# Patient Record
Sex: MALE | Race: WHITE | Employment: OTHER | URBAN - METROPOLITAN AREA
[De-identification: names, ages, dates, MRNs, and addresses within clinical notes are randomized per-mention and may not be internally consistent; named-entity substitution may affect disease eponyms.]

---

## 2017-09-25 ENCOUNTER — CASE MANAGEMENT (OUTPATIENT)
Dept: EMERGENCY DEPT | Age: 72
End: 2017-09-25

## 2018-10-06 ENCOUNTER — APPOINTMENT (OUTPATIENT)
Dept: GENERAL RADIOLOGY | Age: 73
End: 2018-10-06
Payer: MEDICARE

## 2018-10-06 ENCOUNTER — HOSPITAL ENCOUNTER (EMERGENCY)
Age: 73
Discharge: HOME | End: 2018-10-07
Attending: EMERGENCY MEDICINE
Payer: MEDICARE

## 2018-10-06 DIAGNOSIS — T46.3X5A: ICD-10-CM

## 2018-10-06 DIAGNOSIS — R07.9 CHEST PAIN, UNSPECIFIED TYPE: Primary | ICD-10-CM

## 2018-10-06 DIAGNOSIS — R11.0 NAUSEA: ICD-10-CM

## 2018-10-06 DIAGNOSIS — I50.9 ACUTE ON CHRONIC CONGESTIVE HEART FAILURE, UNSPECIFIED HEART FAILURE TYPE (HCC): ICD-10-CM

## 2018-10-06 DIAGNOSIS — Z76.5 DRUG-SEEKING BEHAVIOR: ICD-10-CM

## 2018-10-06 DIAGNOSIS — G44.40: ICD-10-CM

## 2018-10-06 DIAGNOSIS — E87.6 HYPOKALEMIA: ICD-10-CM

## 2018-10-06 LAB
A/G RATIO: 1.4 (ref 1.1–2.2)
ALBUMIN SERPL-MCNC: 4.3 G/DL (ref 3.4–5)
ALP BLD-CCNC: 45 U/L (ref 40–129)
ALT SERPL-CCNC: 25 U/L (ref 10–40)
ANION GAP SERPL CALCULATED.3IONS-SCNC: 14 MMOL/L (ref 3–16)
AST SERPL-CCNC: 23 U/L (ref 15–37)
BASOPHILS ABSOLUTE: 0 K/UL (ref 0–0.2)
BASOPHILS RELATIVE PERCENT: 0.6 %
BILIRUB SERPL-MCNC: 0.5 MG/DL (ref 0–1)
BUN BLDV-MCNC: 17 MG/DL (ref 7–20)
CALCIUM SERPL-MCNC: 9.7 MG/DL (ref 8.3–10.6)
CHLORIDE BLD-SCNC: 100 MMOL/L (ref 99–110)
CO2: 27 MMOL/L (ref 21–32)
CREAT SERPL-MCNC: 1.1 MG/DL (ref 0.8–1.3)
EOSINOPHILS ABSOLUTE: 0.1 K/UL (ref 0–0.6)
EOSINOPHILS RELATIVE PERCENT: 1.8 %
GFR AFRICAN AMERICAN: >60
GFR NON-AFRICAN AMERICAN: >60
GLOBULIN: 3 G/DL
GLUCOSE BLD-MCNC: 107 MG/DL (ref 70–99)
HCT VFR BLD CALC: 36.6 % (ref 40.5–52.5)
HEMOGLOBIN: 11.8 G/DL (ref 13.5–17.5)
LYMPHOCYTES ABSOLUTE: 1.4 K/UL (ref 1–5.1)
LYMPHOCYTES RELATIVE PERCENT: 23.1 %
MCH RBC QN AUTO: 25.5 PG (ref 26–34)
MCHC RBC AUTO-ENTMCNC: 32.4 G/DL (ref 31–36)
MCV RBC AUTO: 78.8 FL (ref 80–100)
MONOCYTES ABSOLUTE: 0.8 K/UL (ref 0–1.3)
MONOCYTES RELATIVE PERCENT: 13.8 %
NEUTROPHILS ABSOLUTE: 3.7 K/UL (ref 1.7–7.7)
NEUTROPHILS RELATIVE PERCENT: 60.7 %
PDW BLD-RTO: 21.7 % (ref 12.4–15.4)
PLATELET # BLD: 214 K/UL (ref 135–450)
PMV BLD AUTO: 7 FL (ref 5–10.5)
POTASSIUM SERPL-SCNC: 3 MMOL/L (ref 3.5–5.1)
RBC # BLD: 4.64 M/UL (ref 4.2–5.9)
SODIUM BLD-SCNC: 141 MMOL/L (ref 136–145)
TOTAL PROTEIN: 7.3 G/DL (ref 6.4–8.2)
TROPONIN: <0.01 NG/ML
WBC # BLD: 6.1 K/UL (ref 4–11)

## 2018-10-06 PROCEDURE — 85379 FIBRIN DEGRADATION QUANT: CPT

## 2018-10-06 PROCEDURE — 85025 COMPLETE CBC W/AUTO DIFF WBC: CPT

## 2018-10-06 PROCEDURE — 83880 ASSAY OF NATRIURETIC PEPTIDE: CPT

## 2018-10-06 PROCEDURE — 80053 COMPREHEN METABOLIC PANEL: CPT

## 2018-10-06 PROCEDURE — 71045 X-RAY EXAM CHEST 1 VIEW: CPT

## 2018-10-06 PROCEDURE — 85610 PROTHROMBIN TIME: CPT

## 2018-10-06 PROCEDURE — 36415 COLL VENOUS BLD VENIPUNCTURE: CPT

## 2018-10-06 PROCEDURE — 99285 EMERGENCY DEPT VISIT HI MDM: CPT

## 2018-10-06 PROCEDURE — 84484 ASSAY OF TROPONIN QUANT: CPT

## 2018-10-06 PROCEDURE — 93005 ELECTROCARDIOGRAM TRACING: CPT | Performed by: EMERGENCY MEDICINE

## 2018-10-06 ASSESSMENT — PAIN DESCRIPTION - PROGRESSION: CLINICAL_PROGRESSION: NOT CHANGED

## 2018-10-06 ASSESSMENT — PAIN DESCRIPTION - PAIN TYPE: TYPE: ACUTE PAIN

## 2018-10-06 ASSESSMENT — PAIN DESCRIPTION - LOCATION: LOCATION: CHEST

## 2018-10-06 ASSESSMENT — PAIN DESCRIPTION - DESCRIPTORS: DESCRIPTORS: DISCOMFORT;HEAVINESS

## 2018-10-06 ASSESSMENT — PAIN SCALES - GENERAL: PAINLEVEL_OUTOF10: 9

## 2018-10-07 ENCOUNTER — APPOINTMENT (OUTPATIENT)
Dept: GENERAL RADIOLOGY | Age: 73
End: 2018-10-07
Payer: MEDICARE

## 2018-10-07 VITALS
BODY MASS INDEX: 32.2 KG/M2 | HEIGHT: 71 IN | TEMPERATURE: 97.7 F | OXYGEN SATURATION: 97 % | SYSTOLIC BLOOD PRESSURE: 136 MMHG | HEART RATE: 86 BPM | RESPIRATION RATE: 14 BRPM | WEIGHT: 230 LBS | DIASTOLIC BLOOD PRESSURE: 87 MMHG

## 2018-10-07 LAB
D DIMER: 216 NG/ML DDU (ref 0–229)
INR BLD: 1.02 (ref 0.86–1.14)
PRO-BNP: 168 PG/ML (ref 0–124)
PROTHROMBIN TIME: 11.6 SEC (ref 9.8–13)
TROPONIN: <0.01 NG/ML

## 2018-10-07 PROCEDURE — 6370000000 HC RX 637 (ALT 250 FOR IP): Performed by: EMERGENCY MEDICINE

## 2018-10-07 PROCEDURE — 84484 ASSAY OF TROPONIN QUANT: CPT

## 2018-10-07 PROCEDURE — 6360000002 HC RX W HCPCS: Performed by: EMERGENCY MEDICINE

## 2018-10-07 PROCEDURE — 93010 ELECTROCARDIOGRAM REPORT: CPT | Performed by: INTERNAL MEDICINE

## 2018-10-07 PROCEDURE — 96374 THER/PROPH/DIAG INJ IV PUSH: CPT

## 2018-10-07 PROCEDURE — 36415 COLL VENOUS BLD VENIPUNCTURE: CPT

## 2018-10-07 PROCEDURE — 71045 X-RAY EXAM CHEST 1 VIEW: CPT

## 2018-10-07 PROCEDURE — 6360000002 HC RX W HCPCS

## 2018-10-07 PROCEDURE — 96375 TX/PRO/DX INJ NEW DRUG ADDON: CPT

## 2018-10-07 RX ORDER — FENTANYL CITRATE 50 UG/ML
50 INJECTION, SOLUTION INTRAMUSCULAR; INTRAVENOUS ONCE
Status: COMPLETED | OUTPATIENT
Start: 2018-10-07 | End: 2018-10-07

## 2018-10-07 RX ORDER — FUROSEMIDE 10 MG/ML
40 INJECTION INTRAMUSCULAR; INTRAVENOUS ONCE
Status: COMPLETED | OUTPATIENT
Start: 2018-10-07 | End: 2018-10-07

## 2018-10-07 RX ORDER — HYDROMORPHONE HCL 110MG/55ML
PATIENT CONTROLLED ANALGESIA SYRINGE INTRAVENOUS
Status: COMPLETED
Start: 2018-10-07 | End: 2018-10-07

## 2018-10-07 RX ORDER — NITROGLYCERIN 0.4 MG/1
0.4 TABLET SUBLINGUAL ONCE
Status: COMPLETED | OUTPATIENT
Start: 2018-10-07 | End: 2018-10-07

## 2018-10-07 RX ORDER — ASPIRIN 81 MG/1
243 TABLET, CHEWABLE ORAL ONCE
Status: COMPLETED | OUTPATIENT
Start: 2018-10-07 | End: 2018-10-07

## 2018-10-07 RX ORDER — ONDANSETRON 2 MG/ML
4 INJECTION INTRAMUSCULAR; INTRAVENOUS ONCE
Status: COMPLETED | OUTPATIENT
Start: 2018-10-07 | End: 2018-10-07

## 2018-10-07 RX ORDER — ONDANSETRON 4 MG/1
4 TABLET, ORALLY DISINTEGRATING ORAL EVERY 8 HOURS PRN
Qty: 16 TABLET | Refills: 0 | Status: SHIPPED | OUTPATIENT
Start: 2018-10-07

## 2018-10-07 RX ORDER — POTASSIUM CHLORIDE 750 MG/1
40 TABLET, EXTENDED RELEASE ORAL ONCE
Status: COMPLETED | OUTPATIENT
Start: 2018-10-07 | End: 2018-10-07

## 2018-10-07 RX ORDER — HYDROCODONE BITARTRATE AND ACETAMINOPHEN 5; 325 MG/1; MG/1
1 TABLET ORAL ONCE
Status: COMPLETED | OUTPATIENT
Start: 2018-10-07 | End: 2018-10-07

## 2018-10-07 RX ADMIN — HYDROMORPHONE HYDROCHLORIDE 1 MG: 2 INJECTION INTRAMUSCULAR; INTRAVENOUS; SUBCUTANEOUS at 02:42

## 2018-10-07 RX ADMIN — NITROGLYCERIN 0.4 MG: 0.4 TABLET SUBLINGUAL at 01:12

## 2018-10-07 RX ADMIN — FUROSEMIDE 40 MG: 10 INJECTION, SOLUTION INTRAMUSCULAR; INTRAVENOUS at 00:33

## 2018-10-07 RX ADMIN — POTASSIUM CHLORIDE 40 MEQ: 750 TABLET, FILM COATED, EXTENDED RELEASE ORAL at 00:33

## 2018-10-07 RX ADMIN — HYDROCODONE BITARTRATE AND ACETAMINOPHEN 1 TABLET: 5; 325 TABLET ORAL at 01:38

## 2018-10-07 RX ADMIN — ONDANSETRON 4 MG: 2 INJECTION INTRAMUSCULAR; INTRAVENOUS at 00:33

## 2018-10-07 RX ADMIN — FENTANYL CITRATE 50 MCG: 50 INJECTION INTRAMUSCULAR; INTRAVENOUS at 00:33

## 2018-10-07 RX ADMIN — ASPIRIN 81 MG 243 MG: 81 TABLET ORAL at 00:33

## 2018-10-07 ASSESSMENT — PAIN SCALES - GENERAL
PAINLEVEL_OUTOF10: 9
PAINLEVEL_OUTOF10: 8
PAINLEVEL_OUTOF10: 9
PAINLEVEL_OUTOF10: 8

## 2018-10-07 ASSESSMENT — PAIN DESCRIPTION - PROGRESSION: CLINICAL_PROGRESSION: NOT CHANGED

## 2018-10-07 ASSESSMENT — PAIN DESCRIPTION - PAIN TYPE: TYPE: ACUTE PAIN

## 2018-10-07 ASSESSMENT — PAIN DESCRIPTION - LOCATION: LOCATION: CHEST

## 2018-10-07 ASSESSMENT — PAIN DESCRIPTION - DESCRIPTORS: DESCRIPTORS: DISCOMFORT;TIGHTNESS

## 2018-10-07 NOTE — ED PROVIDER NOTES
and family. Patient and family in agreement with plan. Orders Placed This Encounter   Procedures    XR CHEST PORTABLE    XR CHEST PORTABLE    CBC auto differential    Comprehensive metabolic panel    Troponin    Brain Natriuretic Peptide    Protime-INR    D-Dimer, Quantitative    Troponin    Telemetry monitoring    EKG 12 Lead    Insert peripheral IV     Orders Placed This Encounter   Medications    furosemide (LASIX) injection 40 mg    aspirin chewable tablet 243 mg    nitroGLYCERIN (NITROSTAT) SL tablet 0.4 mg    fentaNYL (SUBLIMAZE) injection 50 mcg    ondansetron (ZOFRAN) injection 4 mg    potassium chloride (KLOR-CON M) extended release tablet 40 mEq    HYDROcodone-acetaminophen (NORCO) 5-325 MG per tablet 1 tablet    DISCONTD: HYDROmorphone (DILAUDID) injection 1 mg    HYDROmorphone (DILAUDID) 2 MG/ML injection     JAIME IBARRA: cabinet override    ondansetron (ZOFRAN ODT) 4 MG disintegrating tablet     Sig: Take 1 tablet by mouth every 8 hours as needed for Nausea or Vomiting     Dispense:  16 tablet     Refill:  0     Patient was given scripts for the following medications. I counseled patient how to take these medications. Discharge Medication List as of 10/7/2018  3:11 AM      START taking these medications    Details   ondansetron (ZOFRAN ODT) 4 MG disintegrating tablet Take 1 tablet by mouth every 8 hours as needed for Nausea or Vomiting, Disp-16 tablet, R-0Print             CLINICAL IMPRESSION  1. Chest pain, unspecified type    2. Drug-seeking behavior    3. Acute on chronic congestive heart failure, unspecified heart failure type ([address]    4. Hypokalemia    5. Nausea    6. Nitroglycerin-induced headache      DISPOSITION  Rena Nj was discharged to home in stable condition.                    Diana Logan DO  10/29/18 5975

## 2018-10-07 NOTE — ED NOTES
Pt had to wait until 0500 to call ride home r/t narcotic adm. Pt to be discharged with family.   Pt verbalizes an understanding of instructions and f/u care     Domonique Delarosa RN  10/07/18 1852

## 2018-10-09 LAB
EKG ATRIAL RATE: 85 BPM
EKG DIAGNOSIS: NORMAL
EKG P AXIS: 53 DEGREES
EKG P-R INTERVAL: 148 MS
EKG Q-T INTERVAL: 390 MS
EKG QRS DURATION: 102 MS
EKG QTC CALCULATION (BAZETT): 464 MS
EKG R AXIS: -22 DEGREES
EKG T AXIS: 66 DEGREES
EKG VENTRICULAR RATE: 85 BPM

## 2018-10-18 ENCOUNTER — APPOINTMENT (OUTPATIENT)
Dept: GENERAL RADIOLOGY | Age: 73
End: 2018-10-18
Payer: OTHER GOVERNMENT

## 2018-10-18 ENCOUNTER — HOSPITAL ENCOUNTER (OUTPATIENT)
Age: 73
Setting detail: OBSERVATION
Discharge: HOME | End: 2018-10-19
Attending: EMERGENCY MEDICINE | Admitting: INTERNAL MEDICINE
Payer: OTHER GOVERNMENT

## 2018-10-18 DIAGNOSIS — R07.9 CHEST PAIN, UNSPECIFIED TYPE: Primary | ICD-10-CM

## 2018-10-18 DIAGNOSIS — I25.10 CORONARY ARTERY DISEASE INVOLVING NATIVE HEART, ANGINA PRESENCE UNSPECIFIED, UNSPECIFIED VESSEL OR LESION TYPE: ICD-10-CM

## 2018-10-18 LAB
A/G RATIO: 1.8 (ref 1.1–2.2)
ALBUMIN SERPL-MCNC: 4.5 G/DL (ref 3.4–5)
ALP BLD-CCNC: 35 U/L (ref 40–129)
ALT SERPL-CCNC: 18 U/L (ref 10–40)
ANION GAP SERPL CALCULATED.3IONS-SCNC: 12 MMOL/L (ref 3–16)
ANION GAP SERPL CALCULATED.3IONS-SCNC: 12 MMOL/L (ref 3–16)
AST SERPL-CCNC: 19 U/L (ref 15–37)
BASOPHILS ABSOLUTE: 0 K/UL (ref 0–0.2)
BASOPHILS RELATIVE PERCENT: 0.8 %
BILIRUB SERPL-MCNC: 0.4 MG/DL (ref 0–1)
BUN BLDV-MCNC: 11 MG/DL (ref 7–20)
BUN BLDV-MCNC: 12 MG/DL (ref 7–20)
CALCIUM SERPL-MCNC: 9.3 MG/DL (ref 8.3–10.6)
CALCIUM SERPL-MCNC: 9.6 MG/DL (ref 8.3–10.6)
CHLORIDE BLD-SCNC: 95 MMOL/L (ref 99–110)
CHLORIDE BLD-SCNC: 98 MMOL/L (ref 99–110)
CHOLESTEROL, TOTAL: 118 MG/DL (ref 0–199)
CO2: 27 MMOL/L (ref 21–32)
CO2: 29 MMOL/L (ref 21–32)
CREAT SERPL-MCNC: 1 MG/DL (ref 0.8–1.3)
CREAT SERPL-MCNC: 1.1 MG/DL (ref 0.8–1.3)
EKG ATRIAL RATE: 63 BPM
EKG ATRIAL RATE: 69 BPM
EKG DIAGNOSIS: NORMAL
EKG DIAGNOSIS: NORMAL
EKG P AXIS: -1 DEGREES
EKG P AXIS: 30 DEGREES
EKG P-R INTERVAL: 146 MS
EKG P-R INTERVAL: 168 MS
EKG Q-T INTERVAL: 402 MS
EKG Q-T INTERVAL: 436 MS
EKG QRS DURATION: 104 MS
EKG QRS DURATION: 108 MS
EKG QTC CALCULATION (BAZETT): 430 MS
EKG QTC CALCULATION (BAZETT): 446 MS
EKG R AXIS: -21 DEGREES
EKG R AXIS: -23 DEGREES
EKG T AXIS: 31 DEGREES
EKG T AXIS: 37 DEGREES
EKG VENTRICULAR RATE: 63 BPM
EKG VENTRICULAR RATE: 69 BPM
EOSINOPHILS ABSOLUTE: 0.1 K/UL (ref 0–0.6)
EOSINOPHILS RELATIVE PERCENT: 1.4 %
GFR AFRICAN AMERICAN: >60
GFR AFRICAN AMERICAN: >60
GFR NON-AFRICAN AMERICAN: >60
GFR NON-AFRICAN AMERICAN: >60
GLOBULIN: 2.5 G/DL
GLUCOSE BLD-MCNC: 115 MG/DL (ref 70–99)
GLUCOSE BLD-MCNC: 118 MG/DL (ref 70–99)
HCT VFR BLD CALC: 34.2 % (ref 40.5–52.5)
HDLC SERPL-MCNC: 39 MG/DL (ref 40–60)
HEMOGLOBIN: 11.3 G/DL (ref 13.5–17.5)
LDL CHOLESTEROL CALCULATED: 54 MG/DL
LYMPHOCYTES ABSOLUTE: 1.3 K/UL (ref 1–5.1)
LYMPHOCYTES RELATIVE PERCENT: 23.8 %
MAGNESIUM: 2.1 MG/DL (ref 1.8–2.4)
MCH RBC QN AUTO: 25.5 PG (ref 26–34)
MCHC RBC AUTO-ENTMCNC: 33 G/DL (ref 31–36)
MCV RBC AUTO: 77.3 FL (ref 80–100)
MONOCYTES ABSOLUTE: 0.7 K/UL (ref 0–1.3)
MONOCYTES RELATIVE PERCENT: 11.7 %
NEUTROPHILS ABSOLUTE: 3.5 K/UL (ref 1.7–7.7)
NEUTROPHILS RELATIVE PERCENT: 62.3 %
PDW BLD-RTO: 20.6 % (ref 12.4–15.4)
PLATELET # BLD: 233 K/UL (ref 135–450)
PMV BLD AUTO: 7.4 FL (ref 5–10.5)
POTASSIUM REFLEX MAGNESIUM: 3.1 MMOL/L (ref 3.5–5.1)
POTASSIUM SERPL-SCNC: 3.2 MMOL/L (ref 3.5–5.1)
RBC # BLD: 4.43 M/UL (ref 4.2–5.9)
SODIUM BLD-SCNC: 136 MMOL/L (ref 136–145)
SODIUM BLD-SCNC: 137 MMOL/L (ref 136–145)
TOTAL PROTEIN: 7 G/DL (ref 6.4–8.2)
TRIGL SERPL-MCNC: 124 MG/DL (ref 0–150)
TROPONIN: <0.01 NG/ML
VLDLC SERPL CALC-MCNC: 25 MG/DL
WBC # BLD: 5.7 K/UL (ref 4–11)

## 2018-10-18 PROCEDURE — 93005 ELECTROCARDIOGRAM TRACING: CPT | Performed by: INTERNAL MEDICINE

## 2018-10-18 PROCEDURE — 6370000000 HC RX 637 (ALT 250 FOR IP): Performed by: INTERNAL MEDICINE

## 2018-10-18 PROCEDURE — 6370000000 HC RX 637 (ALT 250 FOR IP): Performed by: EMERGENCY MEDICINE

## 2018-10-18 PROCEDURE — 6360000002 HC RX W HCPCS: Performed by: EMERGENCY MEDICINE

## 2018-10-18 PROCEDURE — 93010 ELECTROCARDIOGRAM REPORT: CPT | Performed by: INTERNAL MEDICINE

## 2018-10-18 PROCEDURE — 84484 ASSAY OF TROPONIN QUANT: CPT

## 2018-10-18 PROCEDURE — 96376 TX/PRO/DX INJ SAME DRUG ADON: CPT

## 2018-10-18 PROCEDURE — 96375 TX/PRO/DX INJ NEW DRUG ADDON: CPT

## 2018-10-18 PROCEDURE — 96372 THER/PROPH/DIAG INJ SC/IM: CPT

## 2018-10-18 PROCEDURE — 6370000000 HC RX 637 (ALT 250 FOR IP)

## 2018-10-18 PROCEDURE — 36415 COLL VENOUS BLD VENIPUNCTURE: CPT

## 2018-10-18 PROCEDURE — G0378 HOSPITAL OBSERVATION PER HR: HCPCS

## 2018-10-18 PROCEDURE — 93005 ELECTROCARDIOGRAM TRACING: CPT | Performed by: EMERGENCY MEDICINE

## 2018-10-18 PROCEDURE — 99285 EMERGENCY DEPT VISIT HI MDM: CPT

## 2018-10-18 PROCEDURE — 2580000003 HC RX 258: Performed by: INTERNAL MEDICINE

## 2018-10-18 PROCEDURE — 6360000002 HC RX W HCPCS: Performed by: INTERNAL MEDICINE

## 2018-10-18 PROCEDURE — 80053 COMPREHEN METABOLIC PANEL: CPT

## 2018-10-18 PROCEDURE — 99223 1ST HOSP IP/OBS HIGH 75: CPT | Performed by: PHYSICIAN ASSISTANT

## 2018-10-18 PROCEDURE — 96374 THER/PROPH/DIAG INJ IV PUSH: CPT

## 2018-10-18 PROCEDURE — 71045 X-RAY EXAM CHEST 1 VIEW: CPT

## 2018-10-18 PROCEDURE — 99215 OFFICE O/P EST HI 40 MIN: CPT | Performed by: INTERNAL MEDICINE

## 2018-10-18 PROCEDURE — 83735 ASSAY OF MAGNESIUM: CPT

## 2018-10-18 PROCEDURE — 85025 COMPLETE CBC W/AUTO DIFF WBC: CPT

## 2018-10-18 PROCEDURE — 80061 LIPID PANEL: CPT

## 2018-10-18 RX ORDER — PANTOPRAZOLE SODIUM 20 MG/1
20 TABLET, DELAYED RELEASE ORAL
Status: DISCONTINUED | OUTPATIENT
Start: 2018-10-18 | End: 2018-10-19 | Stop reason: HOSPADM

## 2018-10-18 RX ORDER — POTASSIUM CHLORIDE 20 MEQ/1
40 TABLET, EXTENDED RELEASE ORAL PRN
Status: DISCONTINUED | OUTPATIENT
Start: 2018-10-18 | End: 2018-10-19 | Stop reason: HOSPADM

## 2018-10-18 RX ORDER — ATORVASTATIN CALCIUM 40 MG/1
80 TABLET, FILM COATED ORAL DAILY
Status: DISCONTINUED | OUTPATIENT
Start: 2018-10-18 | End: 2018-10-18

## 2018-10-18 RX ORDER — MORPHINE SULFATE 2 MG/ML
2 INJECTION, SOLUTION INTRAMUSCULAR; INTRAVENOUS
Status: DISCONTINUED | OUTPATIENT
Start: 2018-10-18 | End: 2018-10-19 | Stop reason: HOSPADM

## 2018-10-18 RX ORDER — FUROSEMIDE 20 MG/1
20 TABLET ORAL DAILY
Status: DISCONTINUED | OUTPATIENT
Start: 2018-10-18 | End: 2018-10-19 | Stop reason: HOSPADM

## 2018-10-18 RX ORDER — METOPROLOL TARTRATE 50 MG/1
100 TABLET, FILM COATED ORAL DAILY
Status: DISCONTINUED | OUTPATIENT
Start: 2018-10-18 | End: 2018-10-19 | Stop reason: HOSPADM

## 2018-10-18 RX ORDER — HYDRALAZINE HYDROCHLORIDE 20 MG/ML
10 INJECTION INTRAMUSCULAR; INTRAVENOUS EVERY 6 HOURS PRN
Status: DISCONTINUED | OUTPATIENT
Start: 2018-10-18 | End: 2018-10-19 | Stop reason: HOSPADM

## 2018-10-18 RX ORDER — POTASSIUM CHLORIDE 20 MEQ/1
20 TABLET, EXTENDED RELEASE ORAL 2 TIMES DAILY
Status: DISCONTINUED | OUTPATIENT
Start: 2018-10-18 | End: 2018-10-19

## 2018-10-18 RX ORDER — LISINOPRIL 20 MG/1
20 TABLET ORAL DAILY
Status: DISCONTINUED | OUTPATIENT
Start: 2018-10-18 | End: 2018-10-19 | Stop reason: HOSPADM

## 2018-10-18 RX ORDER — ATORVASTATIN CALCIUM 40 MG/1
80 TABLET, FILM COATED ORAL NIGHTLY
Status: DISCONTINUED | OUTPATIENT
Start: 2018-10-18 | End: 2018-10-19 | Stop reason: HOSPADM

## 2018-10-18 RX ORDER — MAGNESIUM SULFATE 1 G/100ML
1 INJECTION INTRAVENOUS PRN
Status: DISCONTINUED | OUTPATIENT
Start: 2018-10-18 | End: 2018-10-19 | Stop reason: HOSPADM

## 2018-10-18 RX ORDER — CLOPIDOGREL BISULFATE 75 MG/1
75 TABLET ORAL DAILY
Status: DISCONTINUED | OUTPATIENT
Start: 2018-10-18 | End: 2018-10-19 | Stop reason: HOSPADM

## 2018-10-18 RX ORDER — RANOLAZINE 500 MG/1
500 TABLET, EXTENDED RELEASE ORAL 2 TIMES DAILY
Status: DISCONTINUED | OUTPATIENT
Start: 2018-10-18 | End: 2018-10-19 | Stop reason: HOSPADM

## 2018-10-18 RX ORDER — SODIUM CHLORIDE 0.9 % (FLUSH) 0.9 %
10 SYRINGE (ML) INJECTION EVERY 12 HOURS SCHEDULED
Status: DISCONTINUED | OUTPATIENT
Start: 2018-10-18 | End: 2018-10-19 | Stop reason: HOSPADM

## 2018-10-18 RX ORDER — ACETAMINOPHEN 325 MG/1
650 TABLET ORAL EVERY 4 HOURS PRN
Status: DISCONTINUED | OUTPATIENT
Start: 2018-10-18 | End: 2018-10-18

## 2018-10-18 RX ORDER — ASPIRIN 81 MG/1
81 TABLET, CHEWABLE ORAL DAILY
Status: DISCONTINUED | OUTPATIENT
Start: 2018-10-18 | End: 2018-10-19 | Stop reason: HOSPADM

## 2018-10-18 RX ORDER — POTASSIUM CHLORIDE 7.45 MG/ML
10 INJECTION INTRAVENOUS PRN
Status: DISCONTINUED | OUTPATIENT
Start: 2018-10-18 | End: 2018-10-19 | Stop reason: HOSPADM

## 2018-10-18 RX ORDER — DILTIAZEM HYDROCHLORIDE 240 MG/1
240 CAPSULE, COATED, EXTENDED RELEASE ORAL DAILY
Status: DISCONTINUED | OUTPATIENT
Start: 2018-10-18 | End: 2018-10-19 | Stop reason: HOSPADM

## 2018-10-18 RX ORDER — NITROGLYCERIN 0.4 MG/1
0.4 TABLET SUBLINGUAL ONCE
Status: COMPLETED | OUTPATIENT
Start: 2018-10-18 | End: 2018-10-18

## 2018-10-18 RX ORDER — MORPHINE SULFATE 4 MG/ML
4 INJECTION, SOLUTION INTRAMUSCULAR; INTRAVENOUS
Status: COMPLETED | OUTPATIENT
Start: 2018-10-18 | End: 2018-10-18

## 2018-10-18 RX ORDER — HYDROCHLOROTHIAZIDE 25 MG/1
12.5 TABLET ORAL DAILY
Status: DISCONTINUED | OUTPATIENT
Start: 2018-10-18 | End: 2018-10-18

## 2018-10-18 RX ORDER — ACETAMINOPHEN 325 MG/1
650 TABLET ORAL EVERY 4 HOURS PRN
Status: DISCONTINUED | OUTPATIENT
Start: 2018-10-18 | End: 2018-10-19 | Stop reason: HOSPADM

## 2018-10-18 RX ORDER — SODIUM CHLORIDE 9 MG/ML
INJECTION, SOLUTION INTRAVENOUS CONTINUOUS
Status: DISCONTINUED | OUTPATIENT
Start: 2018-10-18 | End: 2018-10-18

## 2018-10-18 RX ORDER — POTASSIUM CHLORIDE 20MEQ/15ML
40 LIQUID (ML) ORAL PRN
Status: DISCONTINUED | OUTPATIENT
Start: 2018-10-18 | End: 2018-10-19 | Stop reason: HOSPADM

## 2018-10-18 RX ORDER — SODIUM CHLORIDE 0.9 % (FLUSH) 0.9 %
10 SYRINGE (ML) INJECTION PRN
Status: DISCONTINUED | OUTPATIENT
Start: 2018-10-18 | End: 2018-10-19 | Stop reason: HOSPADM

## 2018-10-18 RX ORDER — ONDANSETRON 2 MG/ML
4 INJECTION INTRAMUSCULAR; INTRAVENOUS EVERY 6 HOURS PRN
Status: DISCONTINUED | OUTPATIENT
Start: 2018-10-18 | End: 2018-10-19 | Stop reason: HOSPADM

## 2018-10-18 RX ORDER — HYDROCODONE BITARTRATE AND ACETAMINOPHEN 5; 325 MG/1; MG/1
1 TABLET ORAL EVERY 6 HOURS PRN
Status: DISCONTINUED | OUTPATIENT
Start: 2018-10-18 | End: 2018-10-19 | Stop reason: HOSPADM

## 2018-10-18 RX ADMIN — HYDRALAZINE HYDROCHLORIDE 10 MG: 20 INJECTION INTRAMUSCULAR; INTRAVENOUS at 05:43

## 2018-10-18 RX ADMIN — ENOXAPARIN SODIUM 40 MG: 40 INJECTION SUBCUTANEOUS at 10:21

## 2018-10-18 RX ADMIN — ATORVASTATIN CALCIUM 80 MG: 40 TABLET, FILM COATED ORAL at 19:50

## 2018-10-18 RX ADMIN — ACETAMINOPHEN 650 MG: 325 TABLET ORAL at 12:26

## 2018-10-18 RX ADMIN — ASPIRIN 81 MG 81 MG: 81 TABLET ORAL at 10:20

## 2018-10-18 RX ADMIN — HYDROCHLOROTHIAZIDE 12.5 MG: 25 TABLET ORAL at 10:20

## 2018-10-18 RX ADMIN — NITROGLYCERIN 1 INCH: 20 OINTMENT TOPICAL at 03:43

## 2018-10-18 RX ADMIN — POTASSIUM CHLORIDE 20 MEQ: 20 TABLET, EXTENDED RELEASE ORAL at 08:30

## 2018-10-18 RX ADMIN — RANOLAZINE 500 MG: 500 TABLET, FILM COATED, EXTENDED RELEASE ORAL at 19:50

## 2018-10-18 RX ADMIN — LISINOPRIL 20 MG: 20 TABLET ORAL at 10:21

## 2018-10-18 RX ADMIN — PANTOPRAZOLE SODIUM 20 MG: 20 TABLET, DELAYED RELEASE ORAL at 05:41

## 2018-10-18 RX ADMIN — FUROSEMIDE 20 MG: 20 TABLET ORAL at 05:41

## 2018-10-18 RX ADMIN — MORPHINE SULFATE 2 MG: 2 INJECTION, SOLUTION INTRAMUSCULAR; INTRAVENOUS at 08:24

## 2018-10-18 RX ADMIN — HYDROCODONE BITARTRATE AND ACETAMINOPHEN 1 TABLET: 5; 325 TABLET ORAL at 22:35

## 2018-10-18 RX ADMIN — SODIUM CHLORIDE: 9 INJECTION, SOLUTION INTRAVENOUS at 05:42

## 2018-10-18 RX ADMIN — NITROGLYCERIN 0.4 MG: 0.4 TABLET SUBLINGUAL at 01:57

## 2018-10-18 RX ADMIN — MORPHINE SULFATE 4 MG: 4 INJECTION INTRAVENOUS at 01:57

## 2018-10-18 RX ADMIN — POTASSIUM CHLORIDE 20 MEQ: 20 TABLET, EXTENDED RELEASE ORAL at 19:49

## 2018-10-18 RX ADMIN — NITROGLYCERIN 1 INCH: 20 OINTMENT TOPICAL at 05:42

## 2018-10-18 RX ADMIN — Medication 10 ML: at 19:50

## 2018-10-18 RX ADMIN — RANOLAZINE 500 MG: 500 TABLET, FILM COATED, EXTENDED RELEASE ORAL at 12:26

## 2018-10-18 RX ADMIN — MORPHINE SULFATE 4 MG: 4 INJECTION INTRAVENOUS at 03:49

## 2018-10-18 RX ADMIN — CLOPIDOGREL BISULFATE 75 MG: 75 TABLET ORAL at 10:20

## 2018-10-18 RX ADMIN — DILTIAZEM HYDROCHLORIDE 240 MG: 240 CAPSULE, COATED, EXTENDED RELEASE ORAL at 10:21

## 2018-10-18 RX ADMIN — METOPROLOL TARTRATE 100 MG: 50 TABLET ORAL at 10:21

## 2018-10-18 RX ADMIN — HYDROCODONE BITARTRATE AND ACETAMINOPHEN 1 TABLET: 5; 325 TABLET ORAL at 16:24

## 2018-10-18 ASSESSMENT — PAIN DESCRIPTION - LOCATION
LOCATION: CHEST;HEAD
LOCATION: CHEST
LOCATION: CHEST
LOCATION: HEAD
LOCATION_2: HEAD
LOCATION: HEAD

## 2018-10-18 ASSESSMENT — HEART SCORE
ECG: 0
ECG: 0

## 2018-10-18 ASSESSMENT — PAIN DESCRIPTION - PROGRESSION
CLINICAL_PROGRESSION: RAPIDLY WORSENING
CLINICAL_PROGRESSION_2: RAPIDLY WORSENING
CLINICAL_PROGRESSION: GRADUALLY WORSENING
CLINICAL_PROGRESSION: GRADUALLY WORSENING
CLINICAL_PROGRESSION: RAPIDLY WORSENING

## 2018-10-18 ASSESSMENT — PAIN DESCRIPTION - PAIN TYPE
TYPE: ACUTE PAIN
TYPE_2: ACUTE PAIN
TYPE: ACUTE PAIN

## 2018-10-18 ASSESSMENT — PAIN DESCRIPTION - FREQUENCY
FREQUENCY: CONTINUOUS

## 2018-10-18 ASSESSMENT — PAIN DESCRIPTION - DESCRIPTORS
DESCRIPTORS: BURNING;JABBING;NAGGING
DESCRIPTORS: HEADACHE
DESCRIPTORS: ACHING;DISCOMFORT;HEADACHE
DESCRIPTORS: ACHING;DISCOMFORT
DESCRIPTORS_2: ACHING;CONSTANT

## 2018-10-18 ASSESSMENT — PAIN SCALES - GENERAL
PAINLEVEL_OUTOF10: 8
PAINLEVEL_OUTOF10: 6
PAINLEVEL_OUTOF10: 9
PAINLEVEL_OUTOF10: 3
PAINLEVEL_OUTOF10: 7
PAINLEVEL_OUTOF10: 7
PAINLEVEL_OUTOF10: 4
PAINLEVEL_OUTOF10: 7
PAINLEVEL_OUTOF10: 5

## 2018-10-18 ASSESSMENT — PAIN DESCRIPTION - ONSET
ONSET: ON-GOING
ONSET_2: PROGRESSIVE

## 2018-10-18 ASSESSMENT — PAIN DESCRIPTION - ORIENTATION
ORIENTATION_2: MID
ORIENTATION: LEFT
ORIENTATION: LEFT;ANTERIOR;OUTER

## 2018-10-18 ASSESSMENT — PAIN DESCRIPTION - INTENSITY: RATING_2: 5

## 2018-10-18 ASSESSMENT — PAIN DESCRIPTION - DURATION: DURATION_2: CONTINUOUS

## 2018-10-18 NOTE — PROGRESS NOTES
4 Eyes Skin Assessment     The patient is being assess for   Admission    I agree that 2 RN's have performed a thorough Head to Toe Skin Assessment on the patient. ALL assessment sites listed below have been assessed. Areas assessed by both nurses:   [x]   Head, Face, and Ears   [x]   Shoulders, Back, and Chest, Abdomen  [x]   Arms, Elbows, and Hands   [x]   Coccyx, Sacrum, and Ischium  [x]   Legs, Feet, and Heels            **SHARE this note so that the co-signing nurse is able to place an eSignature**    Co-signer eSignature: Electronically signed by Judge Azra RN on 10/18/18 at 7:16 AM    Does the Patient have Skin Breakdown?   No          [place] Prevention initiated:  No   Wound Care Orders initiated:  No      [facility] nurse consulted for Pressure Injury (Stage 3,4, Unstageable, DTI, NWPT, Complex wounds)and New or Established Ostomies:  No      Primary Nurse eSignature: Electronically signed by Annie Calvo RN on 10/18/18 at 7:15 AM

## 2018-10-18 NOTE — CONSULTS
[address] [facility]  [phone]        Reason for Consultation/Chief Complaint: \"I have been having chest pain . \"    History of Present Illness:  Rena Nj is a 68 y.o. patient who presented to the hospital with complaints of substernal pressure type of chest pain. These symptoms happen when his BP is high. Pain happened on day of admission. He reports as soreness. It started at rest associated with headache which worsened with nitro paste in ED. He did not take nitro at home. I have been asked to provide consultation regarding further management and testing. Past Medical History:  [facility]   has a past medical history of CAD (coronary artery disease); Cardiac abnormality; Hyperlipidemia; Hypertension; and MI (myocardial infarction) ([facility] [address] Surgical History:   has a past surgical history that includes Coronary angioplasty with stent; Insertable Cardiac Monitor (2017); Appendectomy; and Cardiac surgery. Social History:   He is , retired, lives alone in [place] [facility]. He reports that he quit smoking about 48 years ago. He has never used smokeless tobacco. He reports that he does not drink alcohol or use drugs. Family History:  Dad  COPD age 71, Mom  age 80 natural causes, Brother  age 63's pericardial tamponade  family history includes Alcohol Abuse in his father; Other in his father. Home Medications:  Were reviewed and are listed in nursing record. and/or listed below  Prior to Admission medications    Medication Sig Start Date End Date Taking?  Authorizing Provider   Atorvastatin Calcium (LIPITOR PO) Take 80 mg by mouth daily    Yes Historical Provider, MD   LISINOPRIL PO Take 20 mg by mouth daily    Yes Historical Provider, MD   aspirin 81 MG tablet Take 81 mg by mouth daily   Yes Historical Provider, MD   METOPROLOL TARTRATE PO Take 100 mg by mouth daily    Yes Historical Provider, MD   clopidogrel (PLAVIX) 75 MG tablet Take 75 mg by mouth daily   Yes Historical Provider, MD   diltiazem (DILTIAZEM CD) 240 MG extended release capsule Take 240 mg by mouth daily   Yes Historical Provider, MD   hydrochlorothiazide (HYDRODIURIL) 12.5 MG tablet Take 12.5 mg by mouth daily   Yes Historical Provider, MD   zolpidem (AMBIEN) 5 MG tablet Take 5 mg by mouth nightly as needed for Sleep   Yes Historical Provider, MD   potassium chloride (KLOR-CON M) 20 MEQ extended release tablet Take 20 mEq by mouth 2 times daily   Yes Historical Provider, MD   lansoprazole (PREVACID) 15 MG delayed release capsule Take 15 mg by mouth 2 times daily   Yes Historical Provider, MD   isosorbide mononitrate (IMDUR) 30 MG extended release tablet Take 30 mg by mouth 2 times daily   Yes Historical Provider, MD   furosemide (LASIX) 20 MG tablet Take 20 mg by mouth daily as needed   Yes Historical Provider, MD   ondansetron (ZOFRAN ODT) 4 MG disintegrating tablet Take 1 tablet by mouth every 8 hours as needed for Nausea or Vomiting 10/7/18   Helayne Free, DO   acetaminophen (TYLENOL) 325 MG tablet Take 650 mg by mouth every 4 hours as needed for Pain    Historical Provider, MD        Allergies:  Patient has no known allergies.      Review of Systems: 12 point ROS negative in all areas as listed below except as in [place]  Constitutional, EENT, Cardiovascular, pulmonary, GI, , Musculoskeletal, skin, neurological, hematological, endocrine, Psychiatric    Physical Examination:    Vitals:    10/18/18 0820   BP: (!) 181/92   Pulse: 83   Resp: 19   Temp: 97.3 °F (36.3 °C)   SpO2: 96%    Weight: 223 lb 15.8 oz (101.6 kg)         General Appearance:  Alert, cooperative, no distress, appears stated age   Head:  Normocephalic, without obvious abnormality, atraumatic   Eyes:  PERRL, conjunctiva/corneas clear       Nose: Nares normal, no drainage or sinus tenderness   Throat: Lips, mucosa, and tongue normal   Neck: Supple, symmetrical, trachea midline, no Problem List   Diagnosis    Hypertensive urgency    Chest pain    History of coronary artery disease    Coronary artery disease involving native coronary artery of native heart with unstable angina pectoris ([address]    Noncompliance    Ascending aorta dilatation ([address]         Plan:  I will treat him with [id number] BID  Continue asa plavix  Beta blocker and nitrates  Statin drugs  BP control  Prn nitro at discharge  Observe him one more day in hospital and control his BP as good as we can       Thank you for allowing to us to participate in the [facility] or [facility]. Further evaluation will be based upon the patient's clinical course and testing results.      Ashely Hogan MD

## 2018-10-18 NOTE — FLOWSHEET NOTE
10/18/18 0824   Vital Signs   Patient Currently in Pain Yes   Pain Assessment   Pain Assessment 0-10   Pain Level 9   Pain Location Chest   Patient's Stated Pain Goal 3   Pain Type Acute pain   Pain Descriptors Burning;Jabbing;Nagging   Clinical Progression Rapidly worsening   Multiple Pain Sites Yes   Pain Orientation Left; Anterior; Outer   Pain Frequency Continuous   Pain Onset On-going   Pain Intervention(s) Medication (see eMar)   Pain 2   Pain Rating 2 5   Pain Type 2 Acute Pain   Pain Location 2 Head   Pain Orientation 2 Mid   Pain Descriptors 2 Aching;Constant   Pain Duration 2 Continuous   Pain Onset 2 Progressive   Clinical Progression 2 Rapidly worsening   Pain Intervention(s) Medication (See emar)   Patient complains of chest pain and headache requested PRN Morphine. Nitro paste remains to Left arm. PRN morphine given see MAR.  Page out to Dr. Flores Session in regard to patient remaining with chest pain  Lotus Vera

## 2018-10-18 NOTE — ED PROVIDER NOTES
Lymphocytes % 23.8 %    Monocytes % 11.7 %    Eosinophils % 1.4 %    Basophils % 0.8 %    Neutrophils # 3.5 1.7 - 7.7 K/uL    Lymphocytes # 1.3 1.0 - 5.1 K/uL    Monocytes # 0.7 0.0 - 1.3 K/uL    Eosinophils # 0.1 0.0 - 0.6 K/uL    Basophils # 0.0 0.0 - 0.2 K/uL   Comprehensive Metabolic Panel   Result Value Ref Range    Sodium 136 136 - 145 mmol/L    Potassium 3.2 (L) 3.5 - 5.1 mmol/L    Chloride 95 (L) 99 - 110 mmol/L    CO2 29 21 - 32 mmol/L    Anion Gap 12 3 - 16    Glucose 118 (H) 70 - 99 mg/dL    BUN 11 7 - 20 mg/dL    CREATININE 1.0 0.8 - 1.3 mg/dL    GFR Non-African American >60 >60    GFR African American >60 >60    Calcium 9.6 8.3 - 10.6 mg/dL    Total Protein 7.0 6.4 - 8.2 g/dL    Alb 4.5 3.4 - 5.0 g/dL    Albumin/Globulin Ratio 1.8 1.1 - 2.2    Total Bilirubin 0.4 0.0 - 1.0 mg/dL    Alkaline Phosphatase 35 (L) 40 - 129 U/L    ALT 18 10 - 40 U/L    AST 19 15 - 37 U/L    Globulin 2.5 g/dL   Troponin   Result Value Ref Range    Troponin <0.01 <0.01 ng/mL   Troponin   Result Value Ref Range    Troponin <0.01 <0.01 ng/mL   Basic Metabolic Panel w/ Reflex to MG   Result Value Ref Range    Sodium 137 136 - 145 mmol/L    Potassium reflex Magnesium 3.1 (L) 3.5 - 5.1 mmol/L    Chloride 98 (L) 99 - 110 mmol/L    CO2 27 21 - 32 mmol/L    Anion Gap 12 3 - 16    Glucose 115 (H) 70 - 99 mg/dL    BUN 12 7 - 20 mg/dL    CREATININE 1.1 0.8 - 1.3 mg/dL    GFR Non-African American >60 >60    GFR African American >60 >60    Calcium 9.3 8.3 - 10.6 mg/dL   Magnesium   Result Value Ref Range    Magnesium 2.10 1.80 - 2.40 mg/dL       I spoke with Dr. Darol Orris. We thoroughly discussed the history, physical exam, laboratory and imaging studies, as well as, emergency department course. Based upon that discussion, we've decided to admit Yoel Prince for further observation and evaluation of Yovany Hatch's chest pain.   As I have deemed necessary from their history, physical, and studies, I have considered and evaluated Yoel Niki for

## 2018-10-18 NOTE — H&P
mouth daily    Yes Historical Provider, MD   aspirin 81 MG tablet Take 81 mg by mouth daily   Yes Historical Provider, MD   METOPROLOL TARTRATE PO Take 100 mg by mouth daily    Yes Historical Provider, MD   clopidogrel (PLAVIX) 75 MG tablet Take 75 mg by mouth daily   Yes Historical Provider, MD   diltiazem (DILTIAZEM CD) 240 MG extended release capsule Take 240 mg by mouth daily   Yes Historical Provider, MD   hydrochlorothiazide (HYDRODIURIL) 12.5 MG tablet Take 12.5 mg by mouth daily   Yes Historical Provider, MD   zolpidem (AMBIEN) 5 MG tablet Take 5 mg by mouth nightly as needed for Sleep   Yes Historical Provider, MD   potassium chloride (KLOR-CON M) 20 MEQ extended release tablet Take 20 mEq by mouth 2 times daily   Yes Historical Provider, MD   lansoprazole (PREVACID) 15 MG delayed release capsule Take 15 mg by mouth 2 times daily   Yes Historical Provider, MD   isosorbide mononitrate (IMDUR) 30 MG extended release tablet Take 30 mg by mouth 2 times daily   Yes Historical Provider, MD   furosemide (LASIX) 20 MG tablet Take 20 mg by mouth daily as needed   Yes Historical Provider, MD   ondansetron (ZOFRAN ODT) 4 MG disintegrating tablet Take 1 tablet by mouth every 8 hours as needed for Nausea or Vomiting 10/7/18   Cordell Rojas,    acetaminophen (TYLENOL) 325 MG tablet Take 650 mg by mouth every 4 hours as needed for Pain    Historical Provider, MD       Allergies:  Patient has no known allergies. Social History:  The patient currently lives at home. TOBACCO:   reports that he quit smoking about 48 years ago. He has never used smokeless tobacco.  ETOH:   reports that he does not drink alcohol.       Family History:   Positive as follows:    Family History   Problem Relation Age of Onset    Alcohol Abuse Father     Other Father         COPD       REVIEW OF SYSTEMS:     Constitutional: Negative for fever   HENT: Negative for sore throat   Eyes: Negative for redness   Respiratory: + dyspnea, no

## 2018-10-19 VITALS
WEIGHT: 219.4 LBS | DIASTOLIC BLOOD PRESSURE: 89 MMHG | HEIGHT: 71 IN | RESPIRATION RATE: 16 BRPM | OXYGEN SATURATION: 95 % | SYSTOLIC BLOOD PRESSURE: 150 MMHG | HEART RATE: 60 BPM | TEMPERATURE: 97 F | BODY MASS INDEX: 30.72 KG/M2

## 2018-10-19 LAB
BASOPHILS ABSOLUTE: 0 K/UL (ref 0–0.2)
BASOPHILS RELATIVE PERCENT: 0.5 %
EOSINOPHILS ABSOLUTE: 0.1 K/UL (ref 0–0.6)
EOSINOPHILS RELATIVE PERCENT: 1.7 %
HCT VFR BLD CALC: 34.5 % (ref 40.5–52.5)
HEMOGLOBIN: 11.3 G/DL (ref 13.5–17.5)
LYMPHOCYTES ABSOLUTE: 1.5 K/UL (ref 1–5.1)
LYMPHOCYTES RELATIVE PERCENT: 24.5 %
MCH RBC QN AUTO: 25.4 PG (ref 26–34)
MCHC RBC AUTO-ENTMCNC: 32.9 G/DL (ref 31–36)
MCV RBC AUTO: 77.3 FL (ref 80–100)
MONOCYTES ABSOLUTE: 0.8 K/UL (ref 0–1.3)
MONOCYTES RELATIVE PERCENT: 13.9 %
NEUTROPHILS ABSOLUTE: 3.6 K/UL (ref 1.7–7.7)
NEUTROPHILS RELATIVE PERCENT: 59.4 %
PDW BLD-RTO: 20.3 % (ref 12.4–15.4)
PLATELET # BLD: 231 K/UL (ref 135–450)
PMV BLD AUTO: 8 FL (ref 5–10.5)
RBC # BLD: 4.46 M/UL (ref 4.2–5.9)
WBC # BLD: 6 K/UL (ref 4–11)

## 2018-10-19 PROCEDURE — 6370000000 HC RX 637 (ALT 250 FOR IP): Performed by: INTERNAL MEDICINE

## 2018-10-19 PROCEDURE — 85025 COMPLETE CBC W/AUTO DIFF WBC: CPT

## 2018-10-19 PROCEDURE — G0378 HOSPITAL OBSERVATION PER HR: HCPCS

## 2018-10-19 PROCEDURE — 6360000002 HC RX W HCPCS: Performed by: INTERNAL MEDICINE

## 2018-10-19 PROCEDURE — 99217 PR OBSERVATION CARE DISCHARGE MANAGEMENT: CPT | Performed by: INTERNAL MEDICINE

## 2018-10-19 PROCEDURE — 2580000003 HC RX 258: Performed by: INTERNAL MEDICINE

## 2018-10-19 PROCEDURE — 96376 TX/PRO/DX INJ SAME DRUG ADON: CPT

## 2018-10-19 PROCEDURE — 99215 OFFICE O/P EST HI 40 MIN: CPT | Performed by: INTERNAL MEDICINE

## 2018-10-19 PROCEDURE — 96372 THER/PROPH/DIAG INJ SC/IM: CPT

## 2018-10-19 PROCEDURE — 36415 COLL VENOUS BLD VENIPUNCTURE: CPT

## 2018-10-19 RX ORDER — POTASSIUM CHLORIDE 20 MEQ/1
20 TABLET, EXTENDED RELEASE ORAL DAILY
COMMUNITY
Start: 2018-10-19

## 2018-10-19 RX ORDER — RANOLAZINE 500 MG/1
500 TABLET, EXTENDED RELEASE ORAL 2 TIMES DAILY
Qty: 60 TABLET | Refills: 3 | Status: SHIPPED | OUTPATIENT
Start: 2018-10-19

## 2018-10-19 RX ORDER — POTASSIUM CHLORIDE 20 MEQ/1
20 TABLET, EXTENDED RELEASE ORAL DAILY
Status: DISCONTINUED | OUTPATIENT
Start: 2018-10-19 | End: 2018-10-19 | Stop reason: HOSPADM

## 2018-10-19 RX ADMIN — ENOXAPARIN SODIUM 40 MG: 40 INJECTION SUBCUTANEOUS at 08:44

## 2018-10-19 RX ADMIN — ASPIRIN 81 MG 81 MG: 81 TABLET ORAL at 08:44

## 2018-10-19 RX ADMIN — RANOLAZINE 500 MG: 500 TABLET, FILM COATED, EXTENDED RELEASE ORAL at 08:45

## 2018-10-19 RX ADMIN — LISINOPRIL 20 MG: 20 TABLET ORAL at 08:45

## 2018-10-19 RX ADMIN — METOPROLOL TARTRATE 100 MG: 50 TABLET ORAL at 08:45

## 2018-10-19 RX ADMIN — HYDROCODONE BITARTRATE AND ACETAMINOPHEN 1 TABLET: 5; 325 TABLET ORAL at 04:44

## 2018-10-19 RX ADMIN — Medication 10 ML: at 08:44

## 2018-10-19 RX ADMIN — PANTOPRAZOLE SODIUM 20 MG: 20 TABLET, DELAYED RELEASE ORAL at 05:35

## 2018-10-19 RX ADMIN — CLOPIDOGREL BISULFATE 75 MG: 75 TABLET ORAL at 08:45

## 2018-10-19 RX ADMIN — HYDROCODONE BITARTRATE AND ACETAMINOPHEN 1 TABLET: 5; 325 TABLET ORAL at 10:47

## 2018-10-19 RX ADMIN — POTASSIUM CHLORIDE 20 MEQ: 20 TABLET, EXTENDED RELEASE ORAL at 08:45

## 2018-10-19 RX ADMIN — DILTIAZEM HYDROCHLORIDE 240 MG: 240 CAPSULE, COATED, EXTENDED RELEASE ORAL at 08:45

## 2018-10-19 RX ADMIN — FUROSEMIDE 20 MG: 20 TABLET ORAL at 08:44

## 2018-10-19 ASSESSMENT — PAIN DESCRIPTION - LOCATION
LOCATION: HEAD
LOCATION: HEAD

## 2018-10-19 ASSESSMENT — PAIN SCALES - GENERAL
PAINLEVEL_OUTOF10: 8
PAINLEVEL_OUTOF10: 8
PAINLEVEL_OUTOF10: 0
PAINLEVEL_OUTOF10: 3

## 2018-10-19 ASSESSMENT — PAIN DESCRIPTION - ONSET: ONSET: ON-GOING

## 2018-10-19 ASSESSMENT — PAIN DESCRIPTION - ORIENTATION: ORIENTATION: LEFT

## 2018-10-19 ASSESSMENT — PAIN DESCRIPTION - PAIN TYPE
TYPE: ACUTE PAIN
TYPE: ACUTE PAIN

## 2018-10-19 ASSESSMENT — PAIN DESCRIPTION - PROGRESSION: CLINICAL_PROGRESSION: GRADUALLY WORSENING

## 2018-10-19 ASSESSMENT — PAIN DESCRIPTION - DESCRIPTORS
DESCRIPTORS: ACHING;DISCOMFORT
DESCRIPTORS: ACHING;DISCOMFORT

## 2018-10-19 ASSESSMENT — PAIN DESCRIPTION - FREQUENCY: FREQUENCY: CONTINUOUS

## 2018-10-19 NOTE — PLAN OF CARE
Problem: Falls - Risk of:  Goal: Will remain free from falls  Will remain free from falls   Outcome: Ongoing    Goal: Absence of physical injury  Absence of physical injury   Outcome: Ongoing      Problem: Discharge Planning:  Goal: Discharged to appropriate level of care  Discharged to appropriate level of care   Outcome: Ongoing      Problem: Tissue Perfusion - Cardiopulmonary, Altered:  Goal: Absence of angina  Absence of angina   Outcome: Ongoing    Goal: Circulatory function within specified parameters  Circulatory function within specified parameters   Outcome: Ongoing

## 2018-10-19 NOTE — PROGRESS NOTES
Attempted to make follow-up appointment with pts PCP, however office is closed at this time. Will instruct pt to make follow-up appointment for in 1 week.

## 2018-10-19 NOTE — PROGRESS NOTES
myocardium at the apex suggesting background of chronic subendocardial ischemia.  This findings should be closely correlated. 3. No acute process in the chest or abdomen.  No evidence of bowel or urinary tract obstruction, peritoneal free air, free fluid, or inflammation. 4. Stable chronic compression fracture L1, scattered colonic diverticula without inflammation.     NM Stress 4/15/17  FINDINGS:  Normal distribution of radiopharmaceutical in the left ventricular  wall.  No perfusion defects on stress or rest images. The left  ventricular chamber size is normal.  Normal left ventricular wall  motion on gated SPECT images.     Lexiscan Stress 4/15/2017  1. Normal stress ECG  Results of Myocardial Perfusion pending separate radiology report. The result of this study is normal.     Left heart cath, angiogram, LV gram 3/14/17  Impression[de-identified]   1. Severe Diagonal stenosis  2. Mild to moderate CAD elsewhere - medical therapy  3. Normal LVEF and LVEDP         Echo limited 10/8/17  Limited study. There is normal left ventricular systolic function. No regional wall motion abnormalities seen. The right ventricular global systolic function is grossly normal.  Valvular function was not assessed, no gross structural abnormalities noted. No comparison study available.     TTE 7/13/17  Study Conclusions  - Left ventricle: The cavity size was normal. Wall thickness was    normal. Systolic function was normal. The estimated ejection    fraction was in the range of 55% to 60%. Wall motion was normal;    there were no regional wall motion abnormalities. Doppler    parameters are consistent with abnormal left ventricular    relaxation (grade 1 diastolic dysfunction). - Right ventricle: Systolic function was normal by objective    interpretation. TAPSE: 1.8cm. Tricuspid annular systolic velocity:    60CP/D. - Atrial septum: Agitated saline contrast study showed no    right-to-left atrial level shunt.   Impressions:  Very associated atelectasis. 8. Significant mediastinal and epicardial fat is present.     Cardiac cath 7/10/17  IMPRESSIONS:  Non-obstructive coronary artery disease unchanged from  prior description. There is moderate diffuse LAD disease as well as m  ild proximal RCA disease. Patent Ostial diagonal 1 stent. SUMMARY:  1. LAD: Proximal vessel lesion: There is a 30-40% stenosis at the     bifurcation, at the origin of the first diagonal. Mid-vessel     lesion: There is a 30% stenosis immediately after the bifurcation     of the diagonal 1.  2. 1st diagonal: Prior intervention: stent in the ostial D1. The     stented segment is patent.     CT angiogram Aorta, chest, abdomen and pelvis 6/28/17  . No evidence of acute aortic pathology.  No evidence of aortic dissection.  Multifocal atherosclerosis of the abdominal and thoracic aorta are noted, including some fusiform ectasia of the distal abdominal aorta to maximum AP diameter 3.2 cm. 2. Multivessel coronary artery atherosclerosis, including multiple stents along the course of the LAD.  There are some subtle changes of the left ventricular myocardium at the apex suggesting background of chronic subendocardial ischemia.  This findings should be closely correlated. 3. No acute process in the chest or abdomen.  No evidence of bowel or urinary tract obstruction, peritoneal free air, free fluid, or inflammation. 4. Stable chronic compression fracture L1, scattered colonic diverticula without inflammation.     NM Stress 4/15/17  FINDINGS:  Normal distribution of radiopharmaceutical in the left ventricular  wall.  No perfusion defects on stress or rest images. The left  ventricular chamber size is normal.  Normal left ventricular wall  motion on gated SPECT images.     Lexiscan Stress 4/15/2017  1. Normal stress ECG  Results of Myocardial Perfusion pending separate radiology report.   The result of this study is normal.     Left heart cath, angiogram, LV gram 3/14/17  Impression[de-identified]   1. Severe Diagonal stenosis  2. Mild to moderate CAD elsewhere - medical therapy  3. 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      Patient Active Problem List   Diagnosis    Hypertensive urgency    Chest pain    History of coronary artery disease    Coronary artery disease involving native coronary artery of native heart with unstable angina pectoris ([facility] [address]    Noncompliance    Ascending aorta dilatation ([facility] [address]          Plan:  I will treat him with [id number] BID  Continue asa plavix  Beta blocker and nitrates  Statin drugs  BP control  Prn nitro at discharge  OK to discharge him today   Not sure who he wants to follow as outpatient he goes all over [facility] for his care and does not give full history  He gets all his tests repeated frequently including invasive angiogram putting him at risk for complications.   Cardiology signing off      Assessment:  Patient Active Problem List    Diagnosis Date Noted    Coronary artery disease      Priority: High    Noncompliance      Priority: High    Ascending aorta dilatation (HCC)      Priority: High    Angina at rest [facility])     Essential hypertension     Hyperkalemia     Hyperlipidemia     Hypertensive urgency     Chest pain     History of coronary artery disease        SUSAN Kelly MD 10/19/2018 8:59 AM

## 2018-10-20 ENCOUNTER — TELEPHONE (OUTPATIENT)
Dept: OTHER | Facility: CLINIC | Age: 73
End: 2018-10-20

## 2018-10-20 ENCOUNTER — HOSPITAL ENCOUNTER (EMERGENCY)
Age: 73
Discharge: HOME | End: 2018-10-20
Attending: EMERGENCY MEDICINE
Payer: MEDICARE

## 2018-10-20 ENCOUNTER — APPOINTMENT (OUTPATIENT)
Dept: GENERAL RADIOLOGY | Age: 73
End: 2018-10-20
Payer: MEDICARE

## 2018-10-20 VITALS
OXYGEN SATURATION: 97 % | DIASTOLIC BLOOD PRESSURE: 94 MMHG | HEIGHT: 71 IN | SYSTOLIC BLOOD PRESSURE: 151 MMHG | TEMPERATURE: 98.2 F | RESPIRATION RATE: 16 BRPM | HEART RATE: 67 BPM | BODY MASS INDEX: 31.08 KG/M2 | WEIGHT: 222 LBS

## 2018-10-20 DIAGNOSIS — Z76.5 DRUG-SEEKING BEHAVIOR: ICD-10-CM

## 2018-10-20 DIAGNOSIS — R51.9 ACUTE NONINTRACTABLE HEADACHE, UNSPECIFIED HEADACHE TYPE: ICD-10-CM

## 2018-10-20 DIAGNOSIS — R07.9 CHEST PAIN, UNSPECIFIED TYPE: Primary | ICD-10-CM

## 2018-10-20 LAB
A/G RATIO: 1.7 (ref 1.1–2.2)
ALBUMIN SERPL-MCNC: 4.5 G/DL (ref 3.4–5)
ALP BLD-CCNC: 36 U/L (ref 40–129)
ALT SERPL-CCNC: 18 U/L (ref 10–40)
ANION GAP SERPL CALCULATED.3IONS-SCNC: 14 MMOL/L (ref 3–16)
AST SERPL-CCNC: 19 U/L (ref 15–37)
BASOPHILS ABSOLUTE: 0.1 K/UL (ref 0–0.2)
BASOPHILS RELATIVE PERCENT: 1.4 %
BILIRUB SERPL-MCNC: 0.5 MG/DL (ref 0–1)
BUN BLDV-MCNC: 17 MG/DL (ref 7–20)
CALCIUM SERPL-MCNC: 9.5 MG/DL (ref 8.3–10.6)
CHLORIDE BLD-SCNC: 96 MMOL/L (ref 99–110)
CO2: 28 MMOL/L (ref 21–32)
CREAT SERPL-MCNC: 1.4 MG/DL (ref 0.8–1.3)
EOSINOPHILS ABSOLUTE: 0.1 K/UL (ref 0–0.6)
EOSINOPHILS RELATIVE PERCENT: 0.9 %
GFR AFRICAN AMERICAN: >60
GFR NON-AFRICAN AMERICAN: 50
GLOBULIN: 2.6 G/DL
GLUCOSE BLD-MCNC: 136 MG/DL (ref 70–99)
HCT VFR BLD CALC: 35.3 % (ref 40.5–52.5)
HEMOGLOBIN: 11.5 G/DL (ref 13.5–17.5)
LYMPHOCYTES ABSOLUTE: 1 K/UL (ref 1–5.1)
LYMPHOCYTES RELATIVE PERCENT: 16.5 %
MCH RBC QN AUTO: 25.6 PG (ref 26–34)
MCHC RBC AUTO-ENTMCNC: 32.6 G/DL (ref 31–36)
MCV RBC AUTO: 78.6 FL (ref 80–100)
MONOCYTES ABSOLUTE: 0.7 K/UL (ref 0–1.3)
MONOCYTES RELATIVE PERCENT: 10.6 %
NEUTROPHILS ABSOLUTE: 4.5 K/UL (ref 1.7–7.7)
NEUTROPHILS RELATIVE PERCENT: 70.6 %
PDW BLD-RTO: 20.3 % (ref 12.4–15.4)
PLATELET # BLD: 239 K/UL (ref 135–450)
PMV BLD AUTO: 7.7 FL (ref 5–10.5)
POTASSIUM REFLEX MAGNESIUM: 3.6 MMOL/L (ref 3.5–5.1)
RBC # BLD: 4.49 M/UL (ref 4.2–5.9)
SODIUM BLD-SCNC: 138 MMOL/L (ref 136–145)
TOTAL PROTEIN: 7.1 G/DL (ref 6.4–8.2)
TROPONIN: <0.01 NG/ML
TROPONIN: <0.01 NG/ML
WBC # BLD: 6.3 K/UL (ref 4–11)

## 2018-10-20 PROCEDURE — 93010 ELECTROCARDIOGRAM REPORT: CPT | Performed by: INTERNAL MEDICINE

## 2018-10-20 PROCEDURE — 71046 X-RAY EXAM CHEST 2 VIEWS: CPT

## 2018-10-20 PROCEDURE — 80053 COMPREHEN METABOLIC PANEL: CPT

## 2018-10-20 PROCEDURE — 84484 ASSAY OF TROPONIN QUANT: CPT

## 2018-10-20 PROCEDURE — 85025 COMPLETE CBC W/AUTO DIFF WBC: CPT

## 2018-10-20 PROCEDURE — 96374 THER/PROPH/DIAG INJ IV PUSH: CPT

## 2018-10-20 PROCEDURE — 99285 EMERGENCY DEPT VISIT HI MDM: CPT

## 2018-10-20 PROCEDURE — 6370000000 HC RX 637 (ALT 250 FOR IP): Performed by: EMERGENCY MEDICINE

## 2018-10-20 PROCEDURE — 93005 ELECTROCARDIOGRAM TRACING: CPT | Performed by: EMERGENCY MEDICINE

## 2018-10-20 PROCEDURE — 6360000002 HC RX W HCPCS: Performed by: EMERGENCY MEDICINE

## 2018-10-20 RX ORDER — NITROGLYCERIN 0.4 MG/1
0.4 TABLET SUBLINGUAL EVERY 5 MIN PRN
Status: DISCONTINUED | OUTPATIENT
Start: 2018-10-20 | End: 2018-10-20 | Stop reason: HOSPADM

## 2018-10-20 RX ORDER — MORPHINE SULFATE 4 MG/ML
4 INJECTION, SOLUTION INTRAMUSCULAR; INTRAVENOUS
Status: COMPLETED | OUTPATIENT
Start: 2018-10-20 | End: 2018-10-20

## 2018-10-20 RX ORDER — RANOLAZINE 500 MG/1
500 TABLET, EXTENDED RELEASE ORAL ONCE
Status: COMPLETED | OUTPATIENT
Start: 2018-10-20 | End: 2018-10-20

## 2018-10-20 RX ADMIN — RANOLAZINE 500 MG: 500 TABLET, FILM COATED, EXTENDED RELEASE ORAL at 02:07

## 2018-10-20 RX ADMIN — NITROGLYCERIN 0.4 MG: 0.4 TABLET SUBLINGUAL at 01:53

## 2018-10-20 RX ADMIN — MORPHINE SULFATE 4 MG: 4 INJECTION INTRAVENOUS at 01:44

## 2018-10-20 RX ADMIN — NITROGLYCERIN 0.4 MG: 0.4 TABLET SUBLINGUAL at 01:42

## 2018-10-20 RX ADMIN — MORPHINE SULFATE 4 MG: 4 INJECTION INTRAVENOUS at 02:10

## 2018-10-20 RX ADMIN — NITROGLYCERIN 0.4 MG: 0.4 TABLET SUBLINGUAL at 01:48

## 2018-10-20 ASSESSMENT — PAIN SCALES - GENERAL
PAINLEVEL_OUTOF10: 0
PAINLEVEL_OUTOF10: 9
PAINLEVEL_OUTOF10: 4
PAINLEVEL_OUTOF10: 9

## 2018-10-20 ASSESSMENT — PAIN DESCRIPTION - LOCATION: LOCATION: HEAD;CHEST

## 2018-10-20 ASSESSMENT — PAIN DESCRIPTION - PAIN TYPE: TYPE: ACUTE PAIN

## 2018-10-20 ASSESSMENT — HEART SCORE: ECG: 0

## 2018-10-20 NOTE — ED PROVIDER NOTES
Alcohol use No    Drug use: No    Sexual activity: Not Currently     Partners: Female     Other Topics Concern    Not on file     Social History Narrative    No narrative on file       Nursing notes reviewed. ED Triage Vitals [10/20/18 0133]   Enc Vitals Group      BP (!) 154/101      Pulse 72      Resp 18      Temp 97.9 °F (36.6 °C)      Temp Source Oral      SpO2 98 %      Weight 222 lb (100.7 kg)      Height 5' 11\" (1.803 m)      Head Circumference       Peak Flow       Pain Score       Pain Loc       Pain Edu? Excl. in [address]? GENERAL:  Awake, alert. Well developed, well nourished with no apparent distress. HENT:  Normocephalic, Atraumatic, no lacerations. EYES:  Conjunctiva normal, Pupils equal round and reactive to light, extraocular movements normal.  NECK:  Trachea is midline. No stridor. CHEST:  Regular rate and rhythm, no murmurs/rubs/gallops, normal S1/S2, chest wall non-tender. LUNGS:  No respiratory distress. No abdominal retractions, no sternal retractions. Clear to auscultation bilaterally, no wheezing, no rhochi, no rales  ABDOMEN:  Soft, non-tender, non-distended. No guarding and no rebound. No costovertebral angle tenderness to palpation. Normal BS, no organomegaly, no abdominal masses  EXTREMITIES:  Normal range of motion, no edema, no tenderness, no deformity, distal pulses present. Moves extremities x4 with purpose. SKIN: Warm, dry and intact. NEUROLOGIC: Normal mental status. Moving all extremities to command. Alert and oriented x4 without focal deficit or gross sensory deficit. Normal speech. PSYCHIATRIC: Not anxious, normal mood and affect, thoughts are linear and organized, without delusions/hallucinations, responds appropriately to questions      LABS and DIAGNOSTIC RESULTS  EKG  The Ekg interpreted by me shows  normal sinus rhythm with a rate of 73 with PVCs  Axis is   Left axis deviation  QTc is  normal  Intervals and Durations are unremarkable.

## 2018-10-22 ENCOUNTER — TELEPHONE (OUTPATIENT)
Dept: OTHER | Facility: CLINIC | Age: 73
End: 2018-10-22

## 2018-10-22 LAB
EKG ATRIAL RATE: 73 BPM
EKG DIAGNOSIS: NORMAL
EKG P AXIS: -7 DEGREES
EKG P-R INTERVAL: 156 MS
EKG Q-T INTERVAL: 426 MS
EKG QRS DURATION: 94 MS
EKG QTC CALCULATION (BAZETT): 469 MS
EKG R AXIS: -34 DEGREES
EKG T AXIS: 16 DEGREES
EKG VENTRICULAR RATE: 73 BPM

## 2018-10-25 ENCOUNTER — TELEPHONE (OUTPATIENT)
Dept: OTHER | Facility: CLINIC | Age: 73
End: 2018-10-25

## 2018-10-25 NOTE — TELEPHONE ENCOUNTER
Unknown caller called RN Access to say that he has had multiple calls for a Milderd Afua at his residence. This RN verified phone number with caller and it does not belong to patient. Removed wrong phone number from chart.

## 2018-11-07 ENCOUNTER — APPOINTMENT (OUTPATIENT)
Dept: GENERAL RADIOLOGY | Age: 73
End: 2018-11-07
Payer: OTHER GOVERNMENT

## 2018-11-07 ENCOUNTER — HOSPITAL ENCOUNTER (EMERGENCY)
Age: 73
Discharge: HOME | End: 2018-11-07
Attending: EMERGENCY MEDICINE
Payer: OTHER GOVERNMENT

## 2018-11-07 VITALS
TEMPERATURE: 97.8 F | WEIGHT: 220 LBS | HEIGHT: 71 IN | SYSTOLIC BLOOD PRESSURE: 176 MMHG | RESPIRATION RATE: 17 BRPM | HEART RATE: 92 BPM | BODY MASS INDEX: 30.8 KG/M2 | DIASTOLIC BLOOD PRESSURE: 91 MMHG | OXYGEN SATURATION: 95 %

## 2018-11-07 DIAGNOSIS — R07.89 ATYPICAL CHEST PAIN: Primary | ICD-10-CM

## 2018-11-07 LAB
A/G RATIO: 1.5 (ref 1.1–2.2)
ALBUMIN SERPL-MCNC: 4.3 G/DL (ref 3.4–5)
ALP BLD-CCNC: 52 U/L (ref 40–129)
ALT SERPL-CCNC: 24 U/L (ref 10–40)
ANION GAP SERPL CALCULATED.3IONS-SCNC: 12 MMOL/L (ref 3–16)
APTT: 32.7 SEC (ref 26–36)
AST SERPL-CCNC: 26 U/L (ref 15–37)
BASOPHILS ABSOLUTE: 0 K/UL (ref 0–0.2)
BASOPHILS RELATIVE PERCENT: 0.7 %
BILIRUB SERPL-MCNC: 0.5 MG/DL (ref 0–1)
BUN BLDV-MCNC: 13 MG/DL (ref 7–20)
CALCIUM SERPL-MCNC: 9.8 MG/DL (ref 8.3–10.6)
CHLORIDE BLD-SCNC: 103 MMOL/L (ref 99–110)
CO2: 24 MMOL/L (ref 21–32)
CREAT SERPL-MCNC: 1.2 MG/DL (ref 0.8–1.3)
EOSINOPHILS ABSOLUTE: 0.1 K/UL (ref 0–0.6)
EOSINOPHILS RELATIVE PERCENT: 1.6 %
GFR AFRICAN AMERICAN: >60
GFR NON-AFRICAN AMERICAN: 59
GLOBULIN: 2.9 G/DL
GLUCOSE BLD-MCNC: 123 MG/DL (ref 70–99)
HCT VFR BLD CALC: 35.3 % (ref 40.5–52.5)
HEMOGLOBIN: 11.6 G/DL (ref 13.5–17.5)
INR BLD: 1.11 (ref 0.86–1.14)
LIPASE: 46 U/L (ref 13–60)
LYMPHOCYTES ABSOLUTE: 1.3 K/UL (ref 1–5.1)
LYMPHOCYTES RELATIVE PERCENT: 20 %
MAGNESIUM: 1.7 MG/DL (ref 1.8–2.4)
MCH RBC QN AUTO: 25.6 PG (ref 26–34)
MCHC RBC AUTO-ENTMCNC: 33 G/DL (ref 31–36)
MCV RBC AUTO: 77.7 FL (ref 80–100)
MONOCYTES ABSOLUTE: 0.7 K/UL (ref 0–1.3)
MONOCYTES RELATIVE PERCENT: 10.8 %
NEUTROPHILS ABSOLUTE: 4.3 K/UL (ref 1.7–7.7)
NEUTROPHILS RELATIVE PERCENT: 66.9 %
PDW BLD-RTO: 18.9 % (ref 12.4–15.4)
PLATELET # BLD: 238 K/UL (ref 135–450)
PMV BLD AUTO: 7.6 FL (ref 5–10.5)
POTASSIUM REFLEX MAGNESIUM: 3.4 MMOL/L (ref 3.5–5.1)
PRO-BNP: 390 PG/ML (ref 0–124)
PROTHROMBIN TIME: 12.7 SEC (ref 9.8–13)
RBC # BLD: 4.54 M/UL (ref 4.2–5.9)
SODIUM BLD-SCNC: 139 MMOL/L (ref 136–145)
TOTAL PROTEIN: 7.2 G/DL (ref 6.4–8.2)
TROPONIN: <0.01 NG/ML
WBC # BLD: 6.4 K/UL (ref 4–11)

## 2018-11-07 PROCEDURE — 85730 THROMBOPLASTIN TIME PARTIAL: CPT

## 2018-11-07 PROCEDURE — 83880 ASSAY OF NATRIURETIC PEPTIDE: CPT

## 2018-11-07 PROCEDURE — 96374 THER/PROPH/DIAG INJ IV PUSH: CPT

## 2018-11-07 PROCEDURE — 6360000002 HC RX W HCPCS: Performed by: EMERGENCY MEDICINE

## 2018-11-07 PROCEDURE — 85610 PROTHROMBIN TIME: CPT

## 2018-11-07 PROCEDURE — 84484 ASSAY OF TROPONIN QUANT: CPT

## 2018-11-07 PROCEDURE — 99285 EMERGENCY DEPT VISIT HI MDM: CPT

## 2018-11-07 PROCEDURE — 83735 ASSAY OF MAGNESIUM: CPT

## 2018-11-07 PROCEDURE — 6370000000 HC RX 637 (ALT 250 FOR IP): Performed by: EMERGENCY MEDICINE

## 2018-11-07 PROCEDURE — 71045 X-RAY EXAM CHEST 1 VIEW: CPT

## 2018-11-07 PROCEDURE — 80053 COMPREHEN METABOLIC PANEL: CPT

## 2018-11-07 PROCEDURE — 93005 ELECTROCARDIOGRAM TRACING: CPT | Performed by: EMERGENCY MEDICINE

## 2018-11-07 PROCEDURE — 83690 ASSAY OF LIPASE: CPT

## 2018-11-07 PROCEDURE — 85025 COMPLETE CBC W/AUTO DIFF WBC: CPT

## 2018-11-07 RX ORDER — ASPIRIN 81 MG/1
324 TABLET, CHEWABLE ORAL ONCE
Status: COMPLETED | OUTPATIENT
Start: 2018-11-07 | End: 2018-11-07

## 2018-11-07 RX ORDER — HYDROMORPHONE HCL 110MG/55ML
1 PATIENT CONTROLLED ANALGESIA SYRINGE INTRAVENOUS ONCE
Status: COMPLETED | OUTPATIENT
Start: 2018-11-07 | End: 2018-11-07

## 2018-11-07 RX ADMIN — ASPIRIN 81 MG 324 MG: 81 TABLET ORAL at 00:35

## 2018-11-07 RX ADMIN — HYDROMORPHONE HYDROCHLORIDE 1 MG: 2 INJECTION INTRAMUSCULAR; INTRAVENOUS; SUBCUTANEOUS at 00:35

## 2018-11-07 ASSESSMENT — PAIN SCALES - GENERAL
PAINLEVEL_OUTOF10: 10
PAINLEVEL_OUTOF10: 10
PAINLEVEL_OUTOF10: 4
PAINLEVEL_OUTOF10: 1

## 2018-11-07 ASSESSMENT — PAIN DESCRIPTION - PAIN TYPE
TYPE: ACUTE PAIN
TYPE: ACUTE PAIN

## 2018-11-07 ASSESSMENT — PAIN DESCRIPTION - LOCATION
LOCATION: CHEST

## 2018-11-07 NOTE — ED NOTES
Dr. Aaron Gaxiola that pt be let go home by cab. Pt agreeable to that as he does not have anyone to come and get him.       Von Fox RN  11/07/18 [id number]

## 2018-11-08 LAB
EKG ATRIAL RATE: 90 BPM
EKG DIAGNOSIS: NORMAL
EKG P AXIS: 43 DEGREES
EKG P-R INTERVAL: 150 MS
EKG Q-T INTERVAL: 384 MS
EKG QRS DURATION: 104 MS
EKG QTC CALCULATION (BAZETT): 469 MS
EKG R AXIS: -10 DEGREES
EKG T AXIS: 62 DEGREES
EKG VENTRICULAR RATE: 90 BPM

## 2018-11-08 PROCEDURE — 93010 ELECTROCARDIOGRAM REPORT: CPT | Performed by: INTERNAL MEDICINE

## 2019-01-09 ENCOUNTER — APPOINTMENT (OUTPATIENT)
Dept: GENERAL RADIOLOGY | Age: 74
End: 2019-01-09
Payer: MEDICARE

## 2019-01-09 ENCOUNTER — HOSPITAL ENCOUNTER (EMERGENCY)
Age: 74
Discharge: HOME | End: 2019-01-09
Attending: EMERGENCY MEDICINE
Payer: MEDICARE

## 2019-01-09 VITALS
HEIGHT: 71 IN | WEIGHT: 228 LBS | RESPIRATION RATE: 18 BRPM | SYSTOLIC BLOOD PRESSURE: 157 MMHG | BODY MASS INDEX: 31.92 KG/M2 | HEART RATE: 90 BPM | DIASTOLIC BLOOD PRESSURE: 84 MMHG | OXYGEN SATURATION: 100 % | TEMPERATURE: 98.5 F

## 2019-01-09 DIAGNOSIS — R07.89 ATYPICAL CHEST PAIN: Primary | ICD-10-CM

## 2019-01-09 LAB
A/G RATIO: 1.8 (ref 1.1–2.2)
ALBUMIN SERPL-MCNC: 4.4 G/DL (ref 3.4–5)
ALP BLD-CCNC: 34 U/L (ref 40–129)
ALT SERPL-CCNC: 14 U/L (ref 10–40)
ANION GAP SERPL CALCULATED.3IONS-SCNC: 12 MMOL/L (ref 3–16)
APTT: 31.2 SEC (ref 26–36)
AST SERPL-CCNC: 27 U/L (ref 15–37)
BASOPHILS ABSOLUTE: 0.1 K/UL (ref 0–0.2)
BASOPHILS RELATIVE PERCENT: 1.9 %
BILIRUB SERPL-MCNC: 0.5 MG/DL (ref 0–1)
BUN BLDV-MCNC: 17 MG/DL (ref 7–20)
CALCIUM SERPL-MCNC: 9.9 MG/DL (ref 8.3–10.6)
CHLORIDE BLD-SCNC: 99 MMOL/L (ref 99–110)
CO2: 27 MMOL/L (ref 21–32)
CREAT SERPL-MCNC: 1.3 MG/DL (ref 0.8–1.3)
EKG ATRIAL RATE: 89 BPM
EKG DIAGNOSIS: NORMAL
EKG P AXIS: 26 DEGREES
EKG P-R INTERVAL: 150 MS
EKG Q-T INTERVAL: 362 MS
EKG QRS DURATION: 102 MS
EKG QTC CALCULATION (BAZETT): 440 MS
EKG R AXIS: -32 DEGREES
EKG T AXIS: 50 DEGREES
EKG VENTRICULAR RATE: 89 BPM
EOSINOPHILS ABSOLUTE: 0.1 K/UL (ref 0–0.6)
EOSINOPHILS RELATIVE PERCENT: 2.1 %
GFR AFRICAN AMERICAN: >60
GFR NON-AFRICAN AMERICAN: 54
GLOBULIN: 2.5 G/DL
GLUCOSE BLD-MCNC: 147 MG/DL (ref 70–99)
HCT VFR BLD CALC: 30.8 % (ref 40.5–52.5)
HEMOGLOBIN: 10.1 G/DL (ref 13.5–17.5)
INR BLD: 1.06 (ref 0.86–1.14)
LIPASE: 49 U/L (ref 13–60)
LYMPHOCYTES ABSOLUTE: 0.8 K/UL (ref 1–5.1)
LYMPHOCYTES RELATIVE PERCENT: 13 %
MCH RBC QN AUTO: 24.9 PG (ref 26–34)
MCHC RBC AUTO-ENTMCNC: 32.7 G/DL (ref 31–36)
MCV RBC AUTO: 76 FL (ref 80–100)
MONOCYTES ABSOLUTE: 0.5 K/UL (ref 0–1.3)
MONOCYTES RELATIVE PERCENT: 7.9 %
NEUTROPHILS ABSOLUTE: 4.4 K/UL (ref 1.7–7.7)
NEUTROPHILS RELATIVE PERCENT: 75.1 %
PDW BLD-RTO: 19 % (ref 12.4–15.4)
PLATELET # BLD: 250 K/UL (ref 135–450)
PMV BLD AUTO: 7.6 FL (ref 5–10.5)
POTASSIUM REFLEX MAGNESIUM: 3.7 MMOL/L (ref 3.5–5.1)
PRO-BNP: 340 PG/ML (ref 0–124)
PROTHROMBIN TIME: 12.1 SEC (ref 9.8–13)
RBC # BLD: 4.05 M/UL (ref 4.2–5.9)
SODIUM BLD-SCNC: 138 MMOL/L (ref 136–145)
TOTAL PROTEIN: 6.9 G/DL (ref 6.4–8.2)
TROPONIN: <0.01 NG/ML
WBC # BLD: 5.9 K/UL (ref 4–11)

## 2019-01-09 PROCEDURE — 6370000000 HC RX 637 (ALT 250 FOR IP): Performed by: EMERGENCY MEDICINE

## 2019-01-09 PROCEDURE — 83690 ASSAY OF LIPASE: CPT

## 2019-01-09 PROCEDURE — 85025 COMPLETE CBC W/AUTO DIFF WBC: CPT

## 2019-01-09 PROCEDURE — 96374 THER/PROPH/DIAG INJ IV PUSH: CPT

## 2019-01-09 PROCEDURE — 71045 X-RAY EXAM CHEST 1 VIEW: CPT

## 2019-01-09 PROCEDURE — 6360000002 HC RX W HCPCS: Performed by: EMERGENCY MEDICINE

## 2019-01-09 PROCEDURE — 93010 ELECTROCARDIOGRAM REPORT: CPT | Performed by: INTERNAL MEDICINE

## 2019-01-09 PROCEDURE — 83880 ASSAY OF NATRIURETIC PEPTIDE: CPT

## 2019-01-09 PROCEDURE — 85610 PROTHROMBIN TIME: CPT

## 2019-01-09 PROCEDURE — 85730 THROMBOPLASTIN TIME PARTIAL: CPT

## 2019-01-09 PROCEDURE — 80053 COMPREHEN METABOLIC PANEL: CPT

## 2019-01-09 PROCEDURE — 99285 EMERGENCY DEPT VISIT HI MDM: CPT

## 2019-01-09 PROCEDURE — 93005 ELECTROCARDIOGRAM TRACING: CPT | Performed by: EMERGENCY MEDICINE

## 2019-01-09 PROCEDURE — 84484 ASSAY OF TROPONIN QUANT: CPT

## 2019-01-09 RX ORDER — KETOROLAC TROMETHAMINE 30 MG/ML
30 INJECTION, SOLUTION INTRAMUSCULAR; INTRAVENOUS ONCE
Status: COMPLETED | OUTPATIENT
Start: 2019-01-09 | End: 2019-01-09

## 2019-01-09 RX ORDER — ACETAMINOPHEN 500 MG
1000 TABLET ORAL ONCE
Status: COMPLETED | OUTPATIENT
Start: 2019-01-09 | End: 2019-01-09

## 2019-01-09 RX ORDER — MORPHINE SULFATE 2 MG/ML
2 INJECTION, SOLUTION INTRAMUSCULAR; INTRAVENOUS ONCE
Status: DISCONTINUED | OUTPATIENT
Start: 2019-01-09 | End: 2019-01-09

## 2019-01-09 RX ADMIN — ACETAMINOPHEN 1000 MG: 500 TABLET ORAL at 05:56

## 2019-01-09 RX ADMIN — KETOROLAC TROMETHAMINE 30 MG: 30 INJECTION, SOLUTION INTRAMUSCULAR; INTRAVENOUS at 05:57

## 2019-01-09 ASSESSMENT — PAIN SCALES - GENERAL: PAINLEVEL_OUTOF10: 9

## 2019-01-09 ASSESSMENT — PAIN DESCRIPTION - LOCATION: LOCATION: CHEST

## 2019-01-09 ASSESSMENT — PAIN DESCRIPTION - ONSET: ONSET: SUDDEN

## 2019-01-09 ASSESSMENT — PAIN DESCRIPTION - FREQUENCY: FREQUENCY: INTERMITTENT

## 2019-01-09 ASSESSMENT — PAIN DESCRIPTION - PROGRESSION: CLINICAL_PROGRESSION: GRADUALLY WORSENING

## 2019-01-09 ASSESSMENT — PAIN DESCRIPTION - DESCRIPTORS: DESCRIPTORS: SHARP

## 2019-01-09 ASSESSMENT — HEART SCORE: ECG: 0

## 2022-03-18 PROBLEM — E66.9 OBESITY: Status: ACTIVE | Noted: 2018-12-20

## 2022-03-18 PROBLEM — E78.5 HLD (HYPERLIPIDEMIA): Status: ACTIVE | Noted: 2018-12-20

## 2022-03-19 PROBLEM — I25.10 CAD (CORONARY ARTERY DISEASE): Status: ACTIVE | Noted: 2018-12-20

## 2022-03-19 PROBLEM — R07.9 CHEST PAIN: Status: ACTIVE | Noted: 2018-12-20

## 2022-03-19 PROBLEM — I10 HTN (HYPERTENSION): Status: ACTIVE | Noted: 2018-12-20

## 2022-03-19 PROBLEM — I25.10 CORONARY ARTERY DISEASE INVOLVING NATIVE CORONARY ARTERY OF NATIVE HEART: Status: ACTIVE | Noted: 2018-12-20

## 2023-01-01 ENCOUNTER — APPOINTMENT (OUTPATIENT)
Dept: CT IMAGING | Age: 78
End: 2023-01-01
Payer: MEDICARE

## 2023-01-01 ENCOUNTER — HOSPITAL ENCOUNTER (EMERGENCY)
Age: 78
Discharge: HOME | End: 2023-01-01
Attending: EMERGENCY MEDICINE
Payer: MEDICARE

## 2023-01-01 ENCOUNTER — APPOINTMENT (OUTPATIENT)
Dept: GENERAL RADIOLOGY | Age: 78
End: 2023-01-01
Payer: MEDICARE

## 2023-01-01 VITALS
DIASTOLIC BLOOD PRESSURE: 87 MMHG | TEMPERATURE: 97.4 F | OXYGEN SATURATION: 94 % | RESPIRATION RATE: 20 BRPM | HEART RATE: 96 BPM | BODY MASS INDEX: 23.03 KG/M2 | HEIGHT: 72 IN | WEIGHT: 170 LBS | SYSTOLIC BLOOD PRESSURE: 129 MMHG

## 2023-01-01 DIAGNOSIS — S42.215A CLOSED NONDISPLACED FRACTURE OF SURGICAL NECK OF LEFT HUMERUS, UNSPECIFIED FRACTURE MORPHOLOGY, INITIAL ENCOUNTER: Primary | ICD-10-CM

## 2023-01-01 DIAGNOSIS — S09.90XA CLOSED HEAD INJURY, INITIAL ENCOUNTER: ICD-10-CM

## 2023-01-01 DIAGNOSIS — S00.83XA TRAUMATIC HEMATOMA OF FOREHEAD, INITIAL ENCOUNTER: ICD-10-CM

## 2023-01-01 LAB
A/G RATIO: 1.4 (ref 1.1–2.2)
ALBUMIN SERPL-MCNC: 3.8 G/DL (ref 3.4–5)
ALP BLD-CCNC: 38 U/L (ref 40–129)
ALT SERPL-CCNC: 55 U/L (ref 10–40)
ANION GAP SERPL CALCULATED.3IONS-SCNC: 15 MMOL/L (ref 3–16)
AST SERPL-CCNC: 74 U/L (ref 15–37)
BASOPHILS ABSOLUTE: 0 K/UL (ref 0–0.2)
BASOPHILS RELATIVE PERCENT: 0.2 %
BILIRUB SERPL-MCNC: 2 MG/DL (ref 0–1)
BUN BLDV-MCNC: 40 MG/DL (ref 7–20)
CALCIUM SERPL-MCNC: 9.4 MG/DL (ref 8.3–10.6)
CHLORIDE BLD-SCNC: 93 MMOL/L (ref 99–110)
CO2: 18 MMOL/L (ref 21–32)
CREAT SERPL-MCNC: 1.5 MG/DL (ref 0.8–1.3)
EOSINOPHILS ABSOLUTE: 0 K/UL (ref 0–0.6)
EOSINOPHILS RELATIVE PERCENT: 0 %
ETHANOL: NORMAL MG/DL (ref 0–0.08)
GFR SERPL CREATININE-BSD FRML MDRD: 48 ML/MIN/{1.73_M2}
GLUCOSE BLD-MCNC: 89 MG/DL (ref 70–99)
HCT VFR BLD CALC: 29.5 % (ref 40.5–52.5)
HEMOGLOBIN: 10 G/DL (ref 13.5–17.5)
INR BLD: 1.5 (ref 0.87–1.14)
LYMPHOCYTES ABSOLUTE: 0.8 K/UL (ref 1–5.1)
LYMPHOCYTES RELATIVE PERCENT: 8.2 %
MCH RBC QN AUTO: 26.7 PG (ref 26–34)
MCHC RBC AUTO-ENTMCNC: 33.9 G/DL (ref 31–36)
MCV RBC AUTO: 78.7 FL (ref 80–100)
MONOCYTES ABSOLUTE: 1 K/UL (ref 0–1.3)
MONOCYTES RELATIVE PERCENT: 10 %
NEUTROPHILS ABSOLUTE: 8.2 K/UL (ref 1.7–7.7)
NEUTROPHILS RELATIVE PERCENT: 81.6 %
PDW BLD-RTO: 17.8 % (ref 12.4–15.4)
PLATELET # BLD: 208 K/UL (ref 135–450)
PMV BLD AUTO: 8.6 FL (ref 5–10.5)
POTASSIUM SERPL-SCNC: 4.3 MMOL/L (ref 3.5–5.1)
PROTHROMBIN TIME: 18 SEC (ref 11.7–14.5)
RBC # BLD: 3.75 M/UL (ref 4.2–5.9)
SODIUM BLD-SCNC: 126 MMOL/L (ref 136–145)
TOTAL PROTEIN: 6.5 G/DL (ref 6.4–8.2)
WBC # BLD: 10 K/UL (ref 4–11)

## 2023-01-01 PROCEDURE — 85610 PROTHROMBIN TIME: CPT

## 2023-01-01 PROCEDURE — 80053 COMPREHEN METABOLIC PANEL: CPT

## 2023-01-01 PROCEDURE — 36415 COLL VENOUS BLD VENIPUNCTURE: CPT

## 2023-01-01 PROCEDURE — 70450 CT HEAD/BRAIN W/O DYE: CPT

## 2023-01-01 PROCEDURE — 96374 THER/PROPH/DIAG INJ IV PUSH: CPT

## 2023-01-01 PROCEDURE — 96375 TX/PRO/DX INJ NEW DRUG ADDON: CPT

## 2023-01-01 PROCEDURE — 6360000002 HC RX W HCPCS: Performed by: EMERGENCY MEDICINE

## 2023-01-01 PROCEDURE — 99284 EMERGENCY DEPT VISIT MOD MDM: CPT

## 2023-01-01 PROCEDURE — 82077 ASSAY SPEC XCP UR&BREATH IA: CPT

## 2023-01-01 PROCEDURE — 73200 CT UPPER EXTREMITY W/O DYE: CPT

## 2023-01-01 PROCEDURE — 71045 X-RAY EXAM CHEST 1 VIEW: CPT

## 2023-01-01 PROCEDURE — 73030 X-RAY EXAM OF SHOULDER: CPT

## 2023-01-01 PROCEDURE — 72125 CT NECK SPINE W/O DYE: CPT

## 2023-01-01 PROCEDURE — 85025 COMPLETE CBC W/AUTO DIFF WBC: CPT

## 2023-01-01 RX ORDER — OXYCODONE HYDROCHLORIDE AND ACETAMINOPHEN 5; 325 MG/1; MG/1
1 TABLET ORAL EVERY 8 HOURS PRN
Qty: 21 TABLET | Refills: 0 | Status: SHIPPED | OUTPATIENT
Start: 2023-01-01 | End: 2023-01-08

## 2023-01-01 RX ORDER — ONDANSETRON 2 MG/ML
4 INJECTION INTRAMUSCULAR; INTRAVENOUS ONCE
Status: COMPLETED | OUTPATIENT
Start: 2023-01-01 | End: 2023-01-01

## 2023-01-01 RX ORDER — ASPIRIN 81 MG/1
81 TABLET, CHEWABLE ORAL DAILY
COMMUNITY

## 2023-01-01 RX ORDER — LOSARTAN POTASSIUM 50 MG/1
50 TABLET ORAL DAILY
COMMUNITY

## 2023-01-01 RX ORDER — LANOLIN ALCOHOL/MO/W.PET/CERES
3 CREAM (GRAM) TOPICAL NIGHTLY PRN
Qty: 30 TABLET | Refills: 0 | Status: SHIPPED | OUTPATIENT
Start: 2023-01-01 | End: 2023-01-31

## 2023-01-01 RX ORDER — ROSUVASTATIN CALCIUM 10 MG/1
10 TABLET, COATED ORAL DAILY
COMMUNITY

## 2023-01-01 RX ORDER — FENTANYL CITRATE 50 UG/ML
75 INJECTION, SOLUTION INTRAMUSCULAR; INTRAVENOUS ONCE
Status: COMPLETED | OUTPATIENT
Start: 2023-01-01 | End: 2023-01-01

## 2023-01-01 RX ADMIN — FENTANYL CITRATE 75 MCG: 50 INJECTION, SOLUTION INTRAMUSCULAR; INTRAVENOUS at 18:07

## 2023-01-01 RX ADMIN — ONDANSETRON HYDROCHLORIDE 4 MG: 2 INJECTION, SOLUTION INTRAMUSCULAR; INTRAVENOUS at 18:25

## 2023-01-01 RX ADMIN — HYDROMORPHONE HYDROCHLORIDE 0.5 MG: 1 INJECTION, SOLUTION INTRAMUSCULAR; INTRAVENOUS; SUBCUTANEOUS at 18:25

## 2023-01-01 ASSESSMENT — ENCOUNTER SYMPTOMS
SHORTNESS OF BREATH: 0
ABDOMINAL PAIN: 0
BACK PAIN: 0

## 2023-01-01 ASSESSMENT — PAIN SCALES - WONG BAKER: WONGBAKER_NUMERICALRESPONSE: 10

## 2023-01-01 ASSESSMENT — PAIN - FUNCTIONAL ASSESSMENT: PAIN_FUNCTIONAL_ASSESSMENT: WONG-BAKER FACES

## 2023-01-01 NOTE — ED NOTES
Oxygen sats decreased to mid 80s on RA after medication administration. Applied 3L/NC, o2 currently 98%     Criselda Watters RN  01/01/23 0993

## 2023-01-01 NOTE — ED PROVIDER NOTES
[address] [facility]      Pt Name: Jadon Adair  MRN: [id number]  [place] 1945  Date of evaluation: 1/1/2023  Provider: Jarrod Casey MD    CHIEF COMPLAINT       Chief Complaint   Patient presents with    Fall     Per EMS, patient fell at home, unknown mechanism. Pt c/o left shoulder pain and has an abrasion and knot on his forehead. EMS states patient has has some confusion. Upon arrival patient is agitated and not oriented to time or year but is oriented to situation. Unknown baseline. Patient lives alone. HISTORY OF PRESENT ILLNESS   (Location/Symptom, Timing/Onset, Context/Setting, Quality, Duration, Modifying Factors, Severity)  Note limiting factors. Jadon Adair is a 69 y.o. male who presents to the emergency department     This patient presents emergency department history of apparently, tripping and falling he supposed to use a walker be in a wheelchair apparently was trying to get up and walk a dog apparently when he tripped and fell forward he had no loss of consciousness    The history is provided by the patient, the EMS personnel and a significant other. Nursing Notes were reviewed. REVIEW OF SYSTEMS    (2-9 systems for level 4, 10 or more for level 5)     Review of Systems   Constitutional:  Positive for activity change. Negative for chills and fever. Respiratory:  Negative for shortness of breath. Cardiovascular:  Negative for chest pain. Gastrointestinal:  Negative for abdominal pain. Musculoskeletal:  Negative for back pain and neck pain. Neurological:  Negative for dizziness. Psychiatric/Behavioral:  Negative for behavioral problems. All other systems reviewed and are negative. Except as noted above the remainder of the review of systems was reviewed and negative.        PAST MEDICAL HISTORY     Past Medical History:   Diagnosis Date    CAD (coronary artery disease)     Cardiac abnormality     Hyperlipidemia Hypertension     MI (myocardial infarction) ([facility] [address] 2017         SURGICAL HISTORY       Past Surgical History:   Procedure Laterality Date    APPENDECTOMY      CARDIAC SURGERY      stents  x3    CORONARY ANGIOPLASTY WITH STENT PLACEMENT      most recent 2017 total of 3 stents    INSERTABLE CARDIAC MONITOR  2017         CURRENT MEDICATIONS       Previous Medications    No medications on file       ALLERGIES     Patient has no known allergies. FAMILY HISTORY       Family History   Problem Relation Age of Onset    Alcohol Abuse Father     Other Father         COPD          SOCIAL HISTORY       Social History     Socioeconomic History    Marital status:      Spouse name: None    Number of children: None    Years of education: None    Highest education level: None   Tobacco Use    Smoking status: Former     Types: Cigarettes     Quit date: 1969     Years since quittin.1    Smokeless tobacco: Never   Substance and Sexual Activity    Alcohol use: No    Drug use: No    Sexual activity: Not Currently     Partners: Female       SCREENINGS    [place] Coma Scale  Eye Opening: Spontaneous  Best Verbal Response: Oriented  Best Motor Response: Obeys commands  [place] Coma Scale Score: 15          PHYSICAL EXAM    (up to 7 for level 4, 8 or more for level 5)     ED Triage Vitals [23 1752]   BP Temp Temp Source Heart Rate Resp SpO2 Height Weight   (!) 105/44 97.4 °F (36.3 °C) Oral 90 16 95 % 6' (1.829 m) 170 lb (77.1 kg)       Physical Exam  Vitals and nursing note reviewed. Constitutional:       Appearance: He is not ill-appearing. HENT:      Right Ear: Tympanic membrane, ear canal and external ear normal.      Left Ear: Tympanic membrane, ear canal and external ear normal.   Eyes:      Pupils: Pupils are equal, round, and reactive to light. Cardiovascular:      Rate and Rhythm: Normal rate. Pulses: Normal pulses.    Pulmonary:      Effort: Pulmonary effort is normal.      Breath sounds: Normal breath sounds. Musculoskeletal:         General: Tenderness and signs of injury present. Cervical back: Neck supple. No rigidity or tenderness. Neurological:      Mental Status: He is alert.        DIAGNOSTIC RESULTS     EKG: All EKG's are interpreted by the Emergency Department Physician who either signs or Co-signs this chart in the absence of a cardiologist.        RADIOLOGY:   Non-plain film images such as CT, Ultrasound and MRI are read by the radiologist. Plain radiographic images are visualized and preliminarily interpreted by the emergency physician with the below findings:        Interpretation per the Radiologist below, if available at the time of this note:    XR CHEST PORTABLE    (Results Pending)   CT HEAD WO CONTRAST    (Results Pending)   CT CERVICAL SPINE WO CONTRAST    (Results Pending)   XR SHOULDER LEFT (MIN 2 VIEWS)    (Results Pending)           LABS:  Results for orders placed or performed during the hospital encounter of 01/01/23   CBC with Auto Differential   Result Value Ref Range    WBC 10.0 4.0 - 11.0 K/uL    RBC 3.75 (L) 4.20 - 5.90 M/uL    Hemoglobin 10.0 (L) 13.5 - 17.5 g/dL    Hematocrit 29.5 (L) 40.5 - 52.5 %    MCV 78.7 (L) 80.0 - 100.0 fL    MCH 26.7 26.0 - 34.0 pg    MCHC 33.9 31.0 - 36.0 g/dL    RDW 17.8 (H) 12.4 - 15.4 %    Platelets 615 703 - 944 K/uL    MPV 8.6 5.0 - 10.5 fL    Neutrophils % 81.6 %    Lymphocytes % 8.2 %    Monocytes % 10.0 %    Eosinophils % 0.0 %    Basophils % 0.2 %    Neutrophils Absolute 8.2 (H) 1.7 - 7.7 K/uL    Lymphocytes Absolute 0.8 (L) 1.0 - 5.1 K/uL    Monocytes Absolute 1.0 0.0 - 1.3 K/uL    Eosinophils Absolute 0.0 0.0 - 0.6 K/uL    Basophils Absolute 0.0 0.0 - 0.2 K/uL   Comprehensive Metabolic Panel   Result Value Ref Range    Sodium 126 (L) 136 - 145 mmol/L    Potassium 4.3 3.5 - 5.1 mmol/L    Chloride 93 (L) 99 - 110 mmol/L    CO2 18 (L) 21 - 32 mmol/L    Anion Gap 15 3 - 16    Glucose 89 70 - 99 mg/dL    BUN 40 (H) 7 - 20 mg/dL Creatinine 1.5 (H) 0.8 - 1.3 mg/dL    Est, Glom Filt Rate 48 (A) >60    Calcium 9.4 8.3 - 10.6 mg/dL    Total Protein 6.5 6.4 - 8.2 g/dL    Albumin 3.8 3.4 - 5.0 g/dL    Albumin/Globulin Ratio 1.4 1.1 - 2.2    Total Bilirubin 2.0 (H) 0.0 - 1.0 mg/dL    Alkaline Phosphatase 38 (L) 40 - 129 U/L    ALT 55 (H) 10 - 40 U/L    AST 74 (H) 15 - 37 U/L   Protime-INR   Result Value Ref Range    Protime 18.0 (H) 11.7 - 14.5 sec    INR 1.50 (H) 0.87 - 1.14            EMERGENCY DEPARTMENT COURSE and DIFFERENTIAL DIAGNOSIS/MDM:     Vitals:    01/01/23 1752 01/01/23 1815 01/01/23 1827 01/01/23 1830   BP: (!) 105/44 113/77     Pulse: 90 89  90   Resp: 16 15  19   Temp: 97.4 °F (36.3 °C)      TempSrc: Oral      SpO2: 95% 94% (!) 85% 98%   Weight: 170 lb (77.1 kg)      Height: 6' (1.829 m)              MDM    This 71-year-old male 71-year-old male presents emergency department by EMS. Apparently about 8 months ago his niece is now living with him. 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 The niece tells me that she really basically did witness the fall was only did not around for about 2 minutes they ran out to the store and then saw it happen on some cameras that they have in their house the patient does have his underlying alcoholic dementia which is worse around sundown.   He is a [facility] patient and she has talked to them about getting possibly a different disposition but have been unsuccessful in that arena at this point at this point patient is screaming with pain over his left shoulder our nursing staff came and got me and requested that I give him narcotics for his pain. I initially gave him 75 mcg of fentanyl intravenously as well as then 0.5 mg of Dilaudid. He did have a fairly large bruise to his forehead but there was no signs of depressed skull fracture there was no raccoons eyes or florentino signs no hemotympanum he had no focal tenderness on his cervical spine palpation is Nexus criteria is basically negative but he is 77 and with his head injury we are CT in his cervical spine as well as his head. We did verify that the patient has not been on Coumadin for over 8 months to 1 year. The patient denies any chest pain shortness of breath abdominal pain. Denies any lower extremity trauma in fact he is moving his hips and his knees freely. He does seem to be appropriate in his complaints he has no focal lateralizing findings but obviously with his bruise in his forehead hematoma we are CT in these cho as well as doing his left shoulder he has no gross deformity of the left shoulder but it is mildly swollen and edematous looking this could be just a bruise I see no obvious fracture or deformity.   Patient underwent labs a CBC was ordered and reviewed  A's complete metabolic profile was ordered and reviewed  His sodium is low at 126 indicating some mild to moderate hyponatremia  This could be reflective of his history of alcoholism in the past and/or his blood pressure medication which includes losartan      REASSESSMENT          CRITICAL CARE TIME     CONSULTS:  None      PROCEDURES:     Procedures    MEDICATIONS GIVEN THIS VISIT:  Medications   fentaNYL (SUBLIMAZE) injection 75 mcg (75 mcg IntraVENous Given 1/1/23 1807)   ondansetron (ZOFRAN) injection 4 mg (4 mg IntraVENous Given 1/1/23 1825)   HYDROmorphone (DILAUDID) injection 0.5 mg (0.5 mg IntraVENous Given 1/1/23 2043)        FINAL IMPRESSION      1. Injury of head, initial encounter    2. Fall, initial encounter    3. Shoulder injury, left, initial encounter            DISPOSITION/PLAN   DISPOSITION Decision To Discharge 01/01/2023 07:04:53 PM      PATIENT REFERRED TO:  No follow-up provider specified. DISCHARGE MEDICATIONS:  New Prescriptions    No medications on file       Controlled Substances Monitoring  RX Monitoring 10/20/2018   Attestation The Prescription Monitoring Report for this patient was reviewed today. Periodic Controlled Substance Monitoring Potential drug abuse or diversion identified, see note documentation. (Please note that portions of this note were completed with a voice recognition program.  Efforts were made to edit the dictations but occasionally words are mis-transcribed.)    Patient was advised to return to the Emergency Department if there was any worsening.     Sarina Dia MD (electronically signed)  Attending Emergency Physician         Allyson Villegas MD  01/02/23 5602

## 2023-01-02 NOTE — ED PROVIDER NOTES
Emergency Department Provider Note  Location: [place] [address] [facility],4[address]      I assumed patient care at 1905 from Dr. Judy Hobbs. Please refer to his/her note for the history, physical exam, and initial medical decision making. Briefly, this is a 69 y.o. male here for fall, left shoulder pain, head injury. Initial report was that patient lives alone but later, RN was able to locate family member Sarah who lives with the patient. Reportedly, patient is supposed to walk with a walker or use a wheelchair but tried to walk without one. He fell and has a hematoma on his forehead. He also has significant tenderness of the left shoulder. At the time of sign out, imaging was pending. INR resulted and was 1.5. RN spoke with family and verified that the patient has not been on coumadin for at least 8 months if not longer. Family also reported that patient had a history of alcoholism but she does not allow patient access to alcohol since the patient moved in with her. Final ED Course and MDM:  Radiology  CT HEAD WO CONTRAST    Result Date: 1/1/2023  EXAMINATION: CT OF THE HEAD WITHOUT CONTRAST  1/1/2023 6:17 pm TECHNIQUE: CT of the head was performed without the administration of intravenous contrast. Automated exposure control, iterative reconstruction, and/or weight based adjustment of the mA/kV was utilized to reduce the radiation dose to as low as reasonably achievable. COMPARISON: None. HISTORY: ORDERING SYSTEM PROVIDED HISTORY: Head trauma on Coumadin TECHNOLOGIST PROVIDED HISTORY: Has a \"code stroke\" or \"stroke alert\" been called? ->No Reason for exam:->Head trauma on Coumadin Decision Support Exception - unselect if not a suspected or confirmed emergency medical condition->Emergency Medical Condition (MA) Reason for Exam: bruising, selling above lt eye due to fall injury FINDINGS: BRAIN/VENTRICLES: There is no acute intracranial hemorrhage, mass effect or midline shift.   No abnormal extra-axial fluid collection. The gray-white differentiation is maintained without evidence of an acute infarct. There is no evidence of hydrocephalus. Mild involutional changes and chronic small vessel ischemic disease. Vascular calcifications ORBITS: The visualized portion of the orbits demonstrate no acute abnormality. SINUSES: The visualized paranasal sinuses and mastoid air cells demonstrate no acute abnormality. SOFT TISSUES/SKULL:  Left anterior frontal soft tissue swelling. No calvarial fracture. No acute intracranial abnormality. Mild chronic microvascular disease. Left anterior frontal soft tissue swelling. CT CERVICAL SPINE WO CONTRAST    Result Date: 1/1/2023  EXAMINATION: CT OF THE CERVICAL SPINE WITHOUT CONTRAST 1/1/2023 6:17 pm TECHNIQUE: CT of the cervical spine was performed without the administration of intravenous contrast. Multiplanar reformatted images are provided for review. Automated exposure control, iterative reconstruction, and/or weight based adjustment of the mA/kV was utilized to reduce the radiation dose to as low as reasonably achievable. COMPARISON: None. HISTORY: ORDERING SYSTEM PROVIDED HISTORY: Fall with head injury TECHNOLOGIST PROVIDED HISTORY: Reason for exam:->Fall with head injury Decision Support Exception - unselect if not a suspected or confirmed emergency medical condition->Emergency Medical Condition (MA) Reason for Exam: fall with head injury FINDINGS: BONES/ALIGNMENT: There is no acute fracture or traumatic malalignment. There is mild loss of height at C6 favored to be chronic. Mild anterolisthesis C3-C4 C4-C5 and C5-C6. DEGENERATIVE CHANGES: Multilevel degenerate change greatest involving facet joints. This space disease greatest C6-C7 SOFT TISSUES: There is no prevertebral soft tissue swelling. Multilevel degenerate change. Mild loss of height at C6 age indeterminate. If warranted clinically, consider MRI or comparison with any prior imaging.  Otherwise no additional acute fracture traumatic malalignment     CT SHOULDER LEFT WO CONTRAST    Result Date: 1/1/2023  EXAMINATION: CT OF THE LEFT SHOULDER WITHOUT CONTRAST 1/1/2023 8:52 pm TECHNIQUE: CT of the left shoulder was performed without the administration of intravenous contrast.  Multiplanar reformatted images are provided for review. Automated exposure control, iterative reconstruction, and/or weight based adjustment of the mA/kV was utilized to reduce the radiation dose to as low as reasonably achievable. COMPARISON: Shoulder radiograph January 1, 2023 HISTORY ORDERING SYSTEM PROVIDED HISTORY: significant left shoulder pain after fall TECHNOLOGIST PROVIDED HISTORY: Reason for exam:->significant left shoulder pain after fall Decision Support Exception - unselect if not a suspected or confirmed emergency medical condition->Emergency Medical Condition (MA) Reason for Exam: left shoulder pain after fall FINDINGS: Bones: Osseous mineralization is decreased. Acute and impacted fracture of the proximal humerus involving the greater tuberosity and anatomic neck. Articular surface remains intact. Remote healed fracture of the left clavicle and partially imaged right clavicle. Soft Tissue: Soft tissue and intramuscular edema adjacent to the proximal left humerus fracture. Atherosclerotic disease. Partially imaged cardiac loop recorder at the left chest wall. Partially imaged bilateral pleural effusions. Atherosclerotic disease. Joint: Anatomic alignment of the left shoulder. Moderate osteoarthritis of the glenohumeral joint and mild degenerative change of the acromioclavicular joint. Anatomic alignment of the left shoulder. 1. Acute fracture of the proximal left humerus involving the anatomical neck and greater tuberosity. 2. Moderate osteoarthritis of the left glenohumeral joint mild osteoarthritis of the acromioclavicular joint. 3. Partially imaged bilateral pleural effusions.      XR SHOULDER LEFT (MIN 2 VIEWS)    Result Date: 1/1/2023  EXAMINATION: 3 XRAY VIEWS OF THE LEFT SHOULDER 1/1/2023 6:17 pm COMPARISON: None. HISTORY: ORDERING SYSTEM PROVIDED HISTORY: pain TECHNOLOGIST PROVIDED HISTORY: Reason for exam:->pain Reason for Exam: lt shoulder/chest pain after fall FINDINGS: There is moderate narrowing of the glenohumeral joint. No fracture or dislocation is seen. The bones are osteopenic. There is an old healed fracture of the distal left clavicle. The scapula is intact. No acute fracture or dislocation is seen. Moderate osteoarthritic changes along the glenohumeral joint and diffuse osteopenia with no obvious acute bony abnormality. Old healed distal left clavicle fracture. Mild hypertrophic changes of the [facility] joint. Diffuse osteopenia     XR CHEST PORTABLE    Result Date: 1/1/2023  EXAMINATION: ONE XRAY VIEW OF THE CHEST 1/1/2023 6:17 pm COMPARISON: 01/09/2019 HISTORY: ORDERING SYSTEM PROVIDED HISTORY: PAIN TECHNOLOGIST PROVIDED HISTORY: Reason for exam:->PAIN Reason for Exam: lt shoulder/chest pain after fall FINDINGS: The heart is mildly enlarged and more prominent. The pulmonary vessels are engorged centrally more prominent. There is hazy right basilar ground-glass opacity which is more apparent. There is a loop recording device along the left lower chest which is unchanged. There is an old healed fracture of the distal left clavicle. The bones are osteopenic. No effusion is seen. Mild cardiomegaly and mild central pulmonary congestion. Hazy right basilar atelectasis or early infiltrate. Diffuse osteopenia with no obvious acute bony abnormality.       Lab  Results for orders placed or performed during the hospital encounter of 01/01/23   CBC with Auto Differential   Result Value Ref Range    WBC 10.0 4.0 - 11.0 K/uL    RBC 3.75 (L) 4.20 - 5.90 M/uL    Hemoglobin 10.0 (L) 13.5 - 17.5 g/dL    Hematocrit 29.5 (L) 40.5 - 52.5 %    MCV 78.7 (L) 80.0 - 100.0 fL    MCH 26.7 26.0 - 34.0 pg    MCHC 33.9 31.0 - 36.0 g/dL    RDW 17.8 (H) 12.4 - 15.4 %    Platelets 280 150 - 337 K/uL    MPV 8.6 5.0 - 10.5 fL    Neutrophils % 81.6 %    Lymphocytes % 8.2 %    Monocytes % 10.0 %    Eosinophils % 0.0 %    Basophils % 0.2 %    Neutrophils Absolute 8.2 (H) 1.7 - 7.7 K/uL    Lymphocytes Absolute 0.8 (L) 1.0 - 5.1 K/uL    Monocytes Absolute 1.0 0.0 - 1.3 K/uL    Eosinophils Absolute 0.0 0.0 - 0.6 K/uL    Basophils Absolute 0.0 0.0 - 0.2 K/uL   Comprehensive Metabolic Panel   Result Value Ref Range    Sodium 126 (L) 136 - 145 mmol/L    Potassium 4.3 3.5 - 5.1 mmol/L    Chloride 93 (L) 99 - 110 mmol/L    CO2 18 (L) 21 - 32 mmol/L    Anion Gap 15 3 - 16    Glucose 89 70 - 99 mg/dL    BUN 40 (H) 7 - 20 mg/dL    Creatinine 1.5 (H) 0.8 - 1.3 mg/dL    Est, Glom Filt Rate 48 (A) >60    Calcium 9.4 8.3 - 10.6 mg/dL    Total Protein 6.5 6.4 - 8.2 g/dL    Albumin 3.8 3.4 - 5.0 g/dL    Albumin/Globulin Ratio 1.4 1.1 - 2.2    Total Bilirubin 2.0 (H) 0.0 - 1.0 mg/dL    Alkaline Phosphatase 38 (L) 40 - 129 U/L    ALT 55 (H) 10 - 40 U/L    AST 74 (H) 15 - 37 U/L   Protime-INR   Result Value Ref Range    Protime 18.0 (H) 11.7 - 14.5 sec    INR 1.50 (H) 0.87 - 1.14         ED Medication Orders (From admission, onward)      Start Ordered     Status Ordering Provider    01/01/23 1845 01/01/23 1819  ondansetron (ZOFRAN) injection 4 mg  ONCE         Last MAR action: Given - by Haley Davila on 01/01/23 at [address] [place] [address] [address] [place]    01/01/23 1845 01/01/23 1819  HYDROmorphone (DILAUDID) injection 0.5 mg  ONCE         Last MAR action: Given - by Haley Davila on 01/01/23 at [address] [facility] [address], [place]    01/01/23 1830 01/01/23 1802  fentaNYL (SUBLIMAZE) injection 75 mcg  ONCE         Last MAR action: Given - by Haley Davila on 01/01/23 at [address] [facility] [place]          -Creatinine is 1.5 today.   I was able to find old records and noted patient's Baseline creatinine is 1.3.  - Sodium 126, which is lower than baseline.  - LFT slightly worse than baseline. INR 1.5. This may be related to alcoholism reported by family. Family member also verified that the patient has not on Coumadin or other anticoagulation. - imaging results reviewed. Head CT did not show acute intracranial hemorrhages. CT of cervical spine did not reveal acute injury. I reviewed the plain film image myself and thought the patient had a left humeral neck fracture. However radiologist did not report an acute fracture. I therefore ordered a CT of the left shoulder for further evaluation. CT confirmed a left humeral neck fracture. I discussed the work-up result with patient's POA. We agreed on sling and swath and outpatient follow-up with orthopedic surgeon. POA also asked that I prescribe something to help the patient sleeps. She expressed some concerns about patient's mental status at night time and it appears that patient may be sundowning at home. I agreed to initiate melatonin for the patient. I also recommended that she discusses these concerns with the PCP on follow-up. I estimate there is LOW risk for TRAUMATIC SUBARACHNOID HEMORRHAGE, SUBDURAL HEMATOMA, EPIDURAL HEMATOMA, CAROTID DISSECTION, VERTEBRAL ARTERY DISSECTION, FRACTURED SKULL, CAUDA EQUINA or CENTRAL CORD SYNDROME, COMPARTMENT SYNDROME, EPIDURAL MASS LESION, HERNIATED DISK CAUSING SEVERE STENOSIS,  INTRA-ABDOMINAL INJURY, RUPTURED ABDOMINAL AORTIC ANEURYSM, PERFORATED BOWEL, TENDON or NEUROVASCULAR INJURY, or a THORACIC AORTIC DISSECTION, thus I consider the discharge disposition reasonable. Also, there is no evidence or peritonitis, sepsis, or toxicity. Marnie Cano and I have discussed the diagnosis and risks, and we agree with discharging home to follow-up with ortho and PCP. We also discussed returning to the Emergency Department immediately if new or worsening symptoms occur.  We have discussed the symptoms which are most concerning (e.g., bloody stool, fever, changing or worsening pain, vomiting) that necessitate immediate return. Clinical Impression:  1. Injury of head, initial encounter    2. Fall, initial encounter    3. Shoulder injury, left, initial encounter        Disposition:  Discharge to home in good condition. Blood pressure 113/77, pulse 90, temperature 97.4 °F (36.3 °C), temperature source Oral, resp. rate 19, height 6' (1.829 m), weight 170 lb (77.1 kg), SpO2 98 %. Patient was given scripts for the following medications. I counseled patient how to take these medications. Discharge Medication List as of 1/1/2023 10:16 PM        START taking these medications    Details   oxyCODONE-acetaminophen (PERCOCET) 5-325 MG per tablet Take 1 tablet by mouth every 8 hours as needed for Pain for up to 7 days. Intended supply: 3 days. Take lowest dose possible to manage pain Max Daily Amount: 3 tablets, Disp-21 tablet, R-0Print      melatonin (RA MELATONIN) 3 MG TABS tablet Take 1 tablet by mouth nightly as needed (difficulty sleeping), Disp-30 tablet, R-0Print             Disposition referral (if applicable): Margretta Fabry, MD  [address]   [phone]    Schedule an appointment as soon as possible for a visit in 3 days      Your primary care doctor at [place]    Schedule an appointment as soon as possible for a visit         Total critical care time is 0 minutes, which excludes separately billable procedures and updating family. Time spent is specifically for management of the presenting complaint and symptoms initially, direct bedside care, reevaluation, review of records, and consultation. There was a high probability of clinically significant life-threatening deterioration in the patient's condition, which required my urgent intervention.      Manoj Wilcox MD  [address] [facility] Tristan Arizmendi MD  01/02/23 [id number]

## 2023-01-02 NOTE — ED NOTES
Per POA, [place], patient has been under her care for the last 8 months. She states the patient was a heavy vodka drinker and has liver failure and kidney failure. Further she states the patient has dementia that has been worsening and sundowning is getting worse. Sarah states that she has been trying to get help from the [place] with NH placement and has not been successful. She states the patient is baseline confused and is supposed to use a walker or wheelchair at home and is not compliant and likely lead to the patient's fall this evening. She reports prior to her taking over his care 8 months ago, he received no medications for the last year, including his coumadin. She verified patient is only taking 3 medications (see med rec).       Rolando Thorne RN  01/01/23 1913

## 2023-02-27 ENCOUNTER — HOSPITAL ENCOUNTER (INPATIENT)
Age: 78
LOS: 1 days | Discharge: HOSPICE | DRG: 291 | End: 2023-02-28
Attending: EMERGENCY MEDICINE | Admitting: INTERNAL MEDICINE
Payer: OTHER GOVERNMENT

## 2023-02-27 ENCOUNTER — APPOINTMENT (OUTPATIENT)
Dept: GENERAL RADIOLOGY | Age: 78
DRG: 291 | End: 2023-02-27
Payer: OTHER GOVERNMENT

## 2023-02-27 ENCOUNTER — APPOINTMENT (OUTPATIENT)
Dept: CT IMAGING | Age: 78
DRG: 291 | End: 2023-02-27
Payer: OTHER GOVERNMENT

## 2023-02-27 DIAGNOSIS — I50.9 ACUTE CONGESTIVE HEART FAILURE, UNSPECIFIED HEART FAILURE TYPE (HCC): ICD-10-CM

## 2023-02-27 DIAGNOSIS — Z86.59 HISTORY OF DEMENTIA: ICD-10-CM

## 2023-02-27 DIAGNOSIS — G93.40 ACUTE ENCEPHALOPATHY: ICD-10-CM

## 2023-02-27 DIAGNOSIS — J96.01 ACUTE RESPIRATORY FAILURE WITH HYPOXIA (HCC): Primary | ICD-10-CM

## 2023-02-27 LAB
A/G RATIO: 1.1 (ref 1.1–2.2)
ALBUMIN SERPL-MCNC: 3.9 G/DL (ref 3.4–5)
ALP BLD-CCNC: 52 U/L (ref 40–129)
ALT SERPL-CCNC: 20 U/L (ref 10–40)
ANION GAP SERPL CALCULATED.3IONS-SCNC: 12 MMOL/L (ref 3–16)
AST SERPL-CCNC: 31 U/L (ref 15–37)
BASE EXCESS VENOUS: 1.4 MMOL/L (ref -3–3)
BASOPHILS ABSOLUTE: 0.1 K/UL (ref 0–0.2)
BASOPHILS RELATIVE PERCENT: 1.1 %
BILIRUB SERPL-MCNC: 0.8 MG/DL (ref 0–1)
BUN BLDV-MCNC: 28 MG/DL (ref 7–20)
CALCIUM SERPL-MCNC: 9.6 MG/DL (ref 8.3–10.6)
CARBOXYHEMOGLOBIN: 3.6 % (ref 0–1.5)
CHLORIDE BLD-SCNC: 104 MMOL/L (ref 99–110)
CO2: 26 MMOL/L (ref 21–32)
CREAT SERPL-MCNC: 1.1 MG/DL (ref 0.8–1.3)
EOSINOPHILS ABSOLUTE: 0.1 K/UL (ref 0–0.6)
EOSINOPHILS RELATIVE PERCENT: 1.2 %
GFR SERPL CREATININE-BSD FRML MDRD: >60 ML/MIN/{1.73_M2}
GLUCOSE BLD-MCNC: 118 MG/DL (ref 70–99)
GLUCOSE BLD-MCNC: 141 MG/DL (ref 70–99)
HCO3 VENOUS: 27.1 MMOL/L (ref 23–29)
HCT VFR BLD CALC: 39.3 % (ref 40.5–52.5)
HEMOGLOBIN, VEN, REDUCED: 7 %
HEMOGLOBIN: 12.4 G/DL (ref 13.5–17.5)
LACTIC ACID, SEPSIS: 1.8 MMOL/L (ref 0.4–1.9)
LYMPHOCYTES ABSOLUTE: 1.2 K/UL (ref 1–5.1)
LYMPHOCYTES RELATIVE PERCENT: 16.9 %
MCH RBC QN AUTO: 29.1 PG (ref 26–34)
MCHC RBC AUTO-ENTMCNC: 31.6 G/DL (ref 31–36)
MCV RBC AUTO: 92.1 FL (ref 80–100)
METHEMOGLOBIN VENOUS: 0 %
MONOCYTES ABSOLUTE: 0.6 K/UL (ref 0–1.3)
MONOCYTES RELATIVE PERCENT: 8.4 %
NEUTROPHILS ABSOLUTE: 5.3 K/UL (ref 1.7–7.7)
NEUTROPHILS RELATIVE PERCENT: 72.4 %
O2 CONTENT, VEN: 16 VOL %
O2 SAT, VEN: 93 %
O2 THERAPY: ABNORMAL
PCO2, VEN: 46.4 MMHG (ref 40–50)
PDW BLD-RTO: 22.2 % (ref 12.4–15.4)
PERFORMED ON: ABNORMAL
PH VENOUS: 7.38 (ref 7.35–7.45)
PLATELET # BLD: 203 K/UL (ref 135–450)
PMV BLD AUTO: 8.9 FL (ref 5–10.5)
PO2, VEN: 70.6 MMHG (ref 25–40)
POTASSIUM REFLEX MAGNESIUM: 4.1 MMOL/L (ref 3.5–5.1)
PRO-BNP: ABNORMAL PG/ML (ref 0–449)
PROCALCITONIN: 0.04 NG/ML (ref 0–0.15)
RBC # BLD: 4.27 M/UL (ref 4.2–5.9)
SARS-COV-2, NAAT: ABNORMAL
SARS-COV-2, NAAT: NOT DETECTED
SODIUM BLD-SCNC: 142 MMOL/L (ref 136–145)
TCO2 CALC VENOUS: 64 MMOL/L
TOTAL PROTEIN: 7.5 G/DL (ref 6.4–8.2)
TROPONIN: 0.03 NG/ML
VALPROIC ACID LEVEL: 32.9 UG/ML (ref 50–100)
WBC # BLD: 7.3 K/UL (ref 4–11)

## 2023-02-27 PROCEDURE — 82803 BLOOD GASES ANY COMBINATION: CPT

## 2023-02-27 PROCEDURE — 51702 INSERT TEMP BLADDER CATH: CPT

## 2023-02-27 PROCEDURE — 93005 ELECTROCARDIOGRAM TRACING: CPT | Performed by: EMERGENCY MEDICINE

## 2023-02-27 PROCEDURE — 87040 BLOOD CULTURE FOR BACTERIA: CPT

## 2023-02-27 PROCEDURE — 85025 COMPLETE CBC W/AUTO DIFF WBC: CPT

## 2023-02-27 PROCEDURE — 96375 TX/PRO/DX INJ NEW DRUG ADDON: CPT

## 2023-02-27 PROCEDURE — 96374 THER/PROPH/DIAG INJ IV PUSH: CPT

## 2023-02-27 PROCEDURE — 6360000004 HC RX CONTRAST MEDICATION: Performed by: EMERGENCY MEDICINE

## 2023-02-27 PROCEDURE — 6360000002 HC RX W HCPCS: Performed by: EMERGENCY MEDICINE

## 2023-02-27 PROCEDURE — 71260 CT THORAX DX C+: CPT | Performed by: EMERGENCY MEDICINE

## 2023-02-27 PROCEDURE — 84484 ASSAY OF TROPONIN QUANT: CPT

## 2023-02-27 PROCEDURE — 6370000000 HC RX 637 (ALT 250 FOR IP): Performed by: EMERGENCY MEDICINE

## 2023-02-27 PROCEDURE — 70450 CT HEAD/BRAIN W/O DYE: CPT

## 2023-02-27 PROCEDURE — 84145 PROCALCITONIN (PCT): CPT

## 2023-02-27 PROCEDURE — 83605 ASSAY OF LACTIC ACID: CPT

## 2023-02-27 PROCEDURE — 94640 AIRWAY INHALATION TREATMENT: CPT

## 2023-02-27 PROCEDURE — 99285 EMERGENCY DEPT VISIT HI MDM: CPT

## 2023-02-27 PROCEDURE — 80053 COMPREHEN METABOLIC PANEL: CPT

## 2023-02-27 PROCEDURE — 87635 SARS-COV-2 COVID-19 AMP PRB: CPT

## 2023-02-27 PROCEDURE — 71045 X-RAY EXAM CHEST 1 VIEW: CPT

## 2023-02-27 PROCEDURE — 83880 ASSAY OF NATRIURETIC PEPTIDE: CPT

## 2023-02-27 PROCEDURE — 80164 ASSAY DIPROPYLACETIC ACD TOT: CPT

## 2023-02-27 PROCEDURE — 6360000002 HC RX W HCPCS

## 2023-02-27 RX ORDER — PANTOPRAZOLE SODIUM 40 MG/1
40 TABLET, DELAYED RELEASE ORAL 2 TIMES DAILY
Status: ON HOLD | COMMUNITY
End: 2023-02-28 | Stop reason: HOSPADM

## 2023-02-27 RX ORDER — THIAMINE MONONITRATE (VIT B1) 100 MG
100 TABLET ORAL DAILY
Status: ON HOLD | COMMUNITY
End: 2023-02-28 | Stop reason: HOSPADM

## 2023-02-27 RX ORDER — ACETAMINOPHEN 325 MG/1
650 TABLET ORAL EVERY 6 HOURS PRN
Status: ON HOLD | COMMUNITY
End: 2023-02-28 | Stop reason: HOSPADM

## 2023-02-27 RX ORDER — FOLIC ACID 1 MG/1
1 TABLET ORAL DAILY
Status: ON HOLD | COMMUNITY
End: 2023-02-28 | Stop reason: HOSPADM

## 2023-02-27 RX ORDER — ALBUTEROL SULFATE 2.5 MG/3ML
2.5 SOLUTION RESPIRATORY (INHALATION) 3 TIMES DAILY PRN
COMMUNITY

## 2023-02-27 RX ORDER — IPRATROPIUM BROMIDE AND ALBUTEROL SULFATE 2.5; .5 MG/3ML; MG/3ML
1 SOLUTION RESPIRATORY (INHALATION) ONCE
Status: COMPLETED | OUTPATIENT
Start: 2023-02-27 | End: 2023-02-27

## 2023-02-27 RX ORDER — FENTANYL CITRATE 50 UG/ML
25 INJECTION, SOLUTION INTRAMUSCULAR; INTRAVENOUS ONCE
Status: COMPLETED | OUTPATIENT
Start: 2023-02-27 | End: 2023-02-27

## 2023-02-27 RX ORDER — DIVALPROEX SODIUM 125 MG/1
125 CAPSULE, COATED PELLETS ORAL 3 TIMES DAILY
Status: ON HOLD | COMMUNITY
End: 2023-02-28 | Stop reason: HOSPADM

## 2023-02-27 RX ORDER — QUETIAPINE FUMARATE 25 MG/1
25 TABLET, FILM COATED ORAL NIGHTLY
Status: ON HOLD | COMMUNITY
End: 2023-02-28 | Stop reason: HOSPADM

## 2023-02-27 RX ORDER — FUROSEMIDE 20 MG/1
20 TABLET ORAL DAILY
Status: ON HOLD | COMMUNITY
End: 2023-02-28 | Stop reason: HOSPADM

## 2023-02-27 RX ORDER — LORAZEPAM 2 MG/ML
INJECTION INTRAMUSCULAR
Status: COMPLETED
Start: 2023-02-27 | End: 2023-02-27

## 2023-02-27 RX ORDER — MIRTAZAPINE 15 MG/1
7.5 TABLET, FILM COATED ORAL NIGHTLY
Status: ON HOLD | COMMUNITY
End: 2023-02-28 | Stop reason: HOSPADM

## 2023-02-27 RX ORDER — LORAZEPAM 2 MG/ML
1 INJECTION INTRAMUSCULAR ONCE
Status: COMPLETED | OUTPATIENT
Start: 2023-02-27 | End: 2023-02-27

## 2023-02-27 RX ORDER — ATORVASTATIN CALCIUM 80 MG/1
80 TABLET, FILM COATED ORAL DAILY
Status: ON HOLD | COMMUNITY
End: 2023-02-28 | Stop reason: HOSPADM

## 2023-02-27 RX ORDER — FLUOXETINE HYDROCHLORIDE 20 MG/1
20 CAPSULE ORAL DAILY
Status: ON HOLD | COMMUNITY
End: 2023-02-28 | Stop reason: HOSPADM

## 2023-02-27 RX ORDER — ONDANSETRON 2 MG/ML
4 INJECTION INTRAMUSCULAR; INTRAVENOUS ONCE
Status: COMPLETED | OUTPATIENT
Start: 2023-02-27 | End: 2023-02-27

## 2023-02-27 RX ORDER — QUETIAPINE FUMARATE 25 MG/1
12.5 TABLET, FILM COATED ORAL
Status: ON HOLD | COMMUNITY
End: 2023-02-28 | Stop reason: HOSPADM

## 2023-02-27 RX ORDER — FUROSEMIDE 10 MG/ML
60 INJECTION INTRAMUSCULAR; INTRAVENOUS ONCE
Status: COMPLETED | OUTPATIENT
Start: 2023-02-27 | End: 2023-02-27

## 2023-02-27 RX ORDER — ONDANSETRON 4 MG/1
4 TABLET, FILM COATED ORAL EVERY 8 HOURS PRN
Status: ON HOLD | COMMUNITY
End: 2023-02-28 | Stop reason: HOSPADM

## 2023-02-27 RX ADMIN — IPRATROPIUM BROMIDE AND ALBUTEROL SULFATE 1 AMPULE: 2.5; .5 SOLUTION RESPIRATORY (INHALATION) at 20:39

## 2023-02-27 RX ADMIN — FENTANYL CITRATE 25 MCG: 50 INJECTION, SOLUTION INTRAMUSCULAR; INTRAVENOUS at 22:02

## 2023-02-27 RX ADMIN — LORAZEPAM 1 MG: 2 INJECTION, SOLUTION INTRAMUSCULAR; INTRAVENOUS at 23:57

## 2023-02-27 RX ADMIN — FUROSEMIDE 60 MG: 10 INJECTION, SOLUTION INTRAMUSCULAR; INTRAVENOUS at 22:25

## 2023-02-27 RX ADMIN — ONDANSETRON 4 MG: 2 INJECTION INTRAMUSCULAR; INTRAVENOUS at 22:01

## 2023-02-27 RX ADMIN — LORAZEPAM 1 MG: 2 INJECTION INTRAMUSCULAR at 23:57

## 2023-02-27 RX ADMIN — IOPAMIDOL 75 ML: 755 INJECTION, SOLUTION INTRAVENOUS at 23:57

## 2023-02-27 ASSESSMENT — PAIN SCALES - GENERAL: PAINLEVEL_OUTOF10: 8

## 2023-02-27 ASSESSMENT — PAIN DESCRIPTION - DESCRIPTORS: DESCRIPTORS: SHARP

## 2023-02-27 ASSESSMENT — PAIN DESCRIPTION - ORIENTATION: ORIENTATION: MID

## 2023-02-27 ASSESSMENT — PAIN DESCRIPTION - LOCATION: LOCATION: BACK

## 2023-02-28 VITALS
HEART RATE: 77 BPM | RESPIRATION RATE: 14 BRPM | TEMPERATURE: 96.9 F | SYSTOLIC BLOOD PRESSURE: 157 MMHG | DIASTOLIC BLOOD PRESSURE: 96 MMHG | BODY MASS INDEX: 25.17 KG/M2 | WEIGHT: 185.6 LBS | OXYGEN SATURATION: 96 %

## 2023-02-28 PROBLEM — I50.33 ACUTE ON CHRONIC DIASTOLIC (CONGESTIVE) HEART FAILURE (HCC): Status: ACTIVE | Noted: 2023-02-28

## 2023-02-28 PROBLEM — F03.C0 ADVANCED DEMENTIA: Status: ACTIVE | Noted: 2023-02-28

## 2023-02-28 PROBLEM — I25.10 CAD (CORONARY ARTERY DISEASE): Status: ACTIVE | Noted: 2018-12-20

## 2023-02-28 LAB
ANION GAP SERPL CALCULATED.3IONS-SCNC: 14 MMOL/L (ref 3–16)
BUN BLDV-MCNC: 26 MG/DL (ref 7–20)
CALCIUM SERPL-MCNC: 9.3 MG/DL (ref 8.3–10.6)
CHLORIDE BLD-SCNC: 106 MMOL/L (ref 99–110)
CO2: 25 MMOL/L (ref 21–32)
CREAT SERPL-MCNC: 1.2 MG/DL (ref 0.8–1.3)
EKG ATRIAL RATE: 78 BPM
EKG DIAGNOSIS: NORMAL
EKG P AXIS: 47 DEGREES
EKG P-R INTERVAL: 152 MS
EKG Q-T INTERVAL: 410 MS
EKG QRS DURATION: 106 MS
EKG QTC CALCULATION (BAZETT): 467 MS
EKG R AXIS: -41 DEGREES
EKG T AXIS: 53 DEGREES
EKG VENTRICULAR RATE: 78 BPM
GFR SERPL CREATININE-BSD FRML MDRD: >60 ML/MIN/{1.73_M2}
GLUCOSE BLD-MCNC: 92 MG/DL (ref 70–99)
LV EF: 23 %
LVEF MODALITY: NORMAL
MAGNESIUM: 2.3 MG/DL (ref 1.8–2.4)
POTASSIUM SERPL-SCNC: 4.5 MMOL/L (ref 3.5–5.1)
REPORT: NORMAL
RESPIRATORY PANEL PCR: NORMAL
SODIUM BLD-SCNC: 145 MMOL/L (ref 136–145)
TROPONIN: 0.04 NG/ML
TROPONIN: 0.04 NG/ML
TSH SERPL DL<=0.05 MIU/L-ACNC: 4.02 UIU/ML (ref 0.27–4.2)

## 2023-02-28 PROCEDURE — 99223 1ST HOSP IP/OBS HIGH 75: CPT | Performed by: INTERNAL MEDICINE

## 2023-02-28 PROCEDURE — 80048 BASIC METABOLIC PNL TOTAL CA: CPT

## 2023-02-28 PROCEDURE — 6360000002 HC RX W HCPCS: Performed by: INTERNAL MEDICINE

## 2023-02-28 PROCEDURE — 83735 ASSAY OF MAGNESIUM: CPT

## 2023-02-28 PROCEDURE — 36415 COLL VENOUS BLD VENIPUNCTURE: CPT

## 2023-02-28 PROCEDURE — 1200000000 HC SEMI PRIVATE

## 2023-02-28 PROCEDURE — 93010 ELECTROCARDIOGRAM REPORT: CPT | Performed by: INTERNAL MEDICINE

## 2023-02-28 PROCEDURE — 2580000003 HC RX 258: Performed by: INTERNAL MEDICINE

## 2023-02-28 PROCEDURE — C8929 TTE W OR WO FOL WCON,DOPPLER: HCPCS

## 2023-02-28 PROCEDURE — 6360000004 HC RX CONTRAST MEDICATION: Performed by: INTERNAL MEDICINE

## 2023-02-28 PROCEDURE — 94640 AIRWAY INHALATION TREATMENT: CPT

## 2023-02-28 PROCEDURE — 84443 ASSAY THYROID STIM HORMONE: CPT

## 2023-02-28 PROCEDURE — 0202U NFCT DS 22 TRGT SARS-COV-2: CPT

## 2023-02-28 PROCEDURE — 2700000000 HC OXYGEN THERAPY PER DAY

## 2023-02-28 PROCEDURE — 84484 ASSAY OF TROPONIN QUANT: CPT

## 2023-02-28 PROCEDURE — 94761 N-INVAS EAR/PLS OXIMETRY MLT: CPT

## 2023-02-28 PROCEDURE — 6360000002 HC RX W HCPCS: Performed by: CLINICAL NURSE SPECIALIST

## 2023-02-28 RX ORDER — ONDANSETRON 2 MG/ML
4 INJECTION INTRAMUSCULAR; INTRAVENOUS EVERY 6 HOURS PRN
Status: DISCONTINUED | OUTPATIENT
Start: 2023-02-28 | End: 2023-02-28 | Stop reason: HOSPADM

## 2023-02-28 RX ORDER — POTASSIUM CHLORIDE 7.45 MG/ML
10 INJECTION INTRAVENOUS PRN
Status: DISCONTINUED | OUTPATIENT
Start: 2023-02-28 | End: 2023-02-28 | Stop reason: HOSPADM

## 2023-02-28 RX ORDER — LORAZEPAM 2 MG/ML
1 INJECTION INTRAMUSCULAR EVERY 6 HOURS PRN
Status: DISCONTINUED | OUTPATIENT
Start: 2023-02-28 | End: 2023-02-28 | Stop reason: HOSPADM

## 2023-02-28 RX ORDER — ALBUTEROL SULFATE 2.5 MG/3ML
2.5 SOLUTION RESPIRATORY (INHALATION) EVERY 4 HOURS PRN
Status: DISCONTINUED | OUTPATIENT
Start: 2023-02-28 | End: 2023-02-28 | Stop reason: HOSPADM

## 2023-02-28 RX ORDER — ACETAMINOPHEN 325 MG/1
650 TABLET ORAL EVERY 6 HOURS PRN
Status: DISCONTINUED | OUTPATIENT
Start: 2023-02-28 | End: 2023-02-28 | Stop reason: HOSPADM

## 2023-02-28 RX ORDER — ACETAMINOPHEN 650 MG/1
650 SUPPOSITORY RECTAL EVERY 6 HOURS PRN
Status: DISCONTINUED | OUTPATIENT
Start: 2023-02-28 | End: 2023-02-28 | Stop reason: HOSPADM

## 2023-02-28 RX ORDER — SODIUM CHLORIDE 0.9 % (FLUSH) 0.9 %
5-40 SYRINGE (ML) INJECTION PRN
Status: DISCONTINUED | OUTPATIENT
Start: 2023-02-28 | End: 2023-02-28 | Stop reason: HOSPADM

## 2023-02-28 RX ORDER — DIVALPROEX SODIUM 125 MG/1
125 CAPSULE, COATED PELLETS ORAL 3 TIMES DAILY
Status: DISCONTINUED | OUTPATIENT
Start: 2023-02-28 | End: 2023-02-28 | Stop reason: HOSPADM

## 2023-02-28 RX ORDER — POLYETHYLENE GLYCOL 3350 17 G/17G
17 POWDER, FOR SOLUTION ORAL DAILY PRN
Status: DISCONTINUED | OUTPATIENT
Start: 2023-02-28 | End: 2023-02-28 | Stop reason: HOSPADM

## 2023-02-28 RX ORDER — PANTOPRAZOLE SODIUM 40 MG/1
40 TABLET, DELAYED RELEASE ORAL 2 TIMES DAILY
Status: DISCONTINUED | OUTPATIENT
Start: 2023-02-28 | End: 2023-02-28

## 2023-02-28 RX ORDER — ZIPRASIDONE MESYLATE 20 MG/ML
20 INJECTION, POWDER, LYOPHILIZED, FOR SOLUTION INTRAMUSCULAR EVERY 6 HOURS PRN
Status: DISCONTINUED | OUTPATIENT
Start: 2023-02-28 | End: 2023-02-28 | Stop reason: HOSPADM

## 2023-02-28 RX ORDER — FUROSEMIDE 10 MG/ML
40 INJECTION INTRAMUSCULAR; INTRAVENOUS 2 TIMES DAILY
Status: DISCONTINUED | OUTPATIENT
Start: 2023-02-28 | End: 2023-02-28 | Stop reason: HOSPADM

## 2023-02-28 RX ORDER — SODIUM CHLORIDE 9 MG/ML
INJECTION, SOLUTION INTRAVENOUS PRN
Status: DISCONTINUED | OUTPATIENT
Start: 2023-02-28 | End: 2023-02-28 | Stop reason: HOSPADM

## 2023-02-28 RX ORDER — FLUOXETINE HYDROCHLORIDE 20 MG/1
20 CAPSULE ORAL DAILY
Status: DISCONTINUED | OUTPATIENT
Start: 2023-02-28 | End: 2023-02-28 | Stop reason: HOSPADM

## 2023-02-28 RX ORDER — MAGNESIUM SULFATE IN WATER 40 MG/ML
2000 INJECTION, SOLUTION INTRAVENOUS PRN
Status: DISCONTINUED | OUTPATIENT
Start: 2023-02-28 | End: 2023-02-28 | Stop reason: HOSPADM

## 2023-02-28 RX ORDER — QUETIAPINE FUMARATE 25 MG/1
25 TABLET, FILM COATED ORAL NIGHTLY
Status: DISCONTINUED | OUTPATIENT
Start: 2023-02-28 | End: 2023-02-28 | Stop reason: HOSPADM

## 2023-02-28 RX ORDER — MORPHINE SULFATE 4 MG/ML
4 INJECTION, SOLUTION INTRAMUSCULAR; INTRAVENOUS
Status: DISCONTINUED | OUTPATIENT
Start: 2023-02-28 | End: 2023-02-28 | Stop reason: HOSPADM

## 2023-02-28 RX ORDER — ASPIRIN 81 MG/1
81 TABLET, CHEWABLE ORAL DAILY
Status: DISCONTINUED | OUTPATIENT
Start: 2023-02-28 | End: 2023-02-28

## 2023-02-28 RX ORDER — ENOXAPARIN SODIUM 100 MG/ML
40 INJECTION SUBCUTANEOUS DAILY
Status: DISCONTINUED | OUTPATIENT
Start: 2023-02-28 | End: 2023-02-28

## 2023-02-28 RX ORDER — GAUZE BANDAGE 2" X 2"
100 BANDAGE TOPICAL DAILY
Status: DISCONTINUED | OUTPATIENT
Start: 2023-02-28 | End: 2023-02-28 | Stop reason: HOSPADM

## 2023-02-28 RX ORDER — ATORVASTATIN CALCIUM 80 MG/1
80 TABLET, FILM COATED ORAL DAILY
Status: DISCONTINUED | OUTPATIENT
Start: 2023-02-28 | End: 2023-02-28

## 2023-02-28 RX ORDER — LISINOPRIL 5 MG/1
2.5 TABLET ORAL DAILY
Status: DISCONTINUED | OUTPATIENT
Start: 2023-02-28 | End: 2023-02-28

## 2023-02-28 RX ORDER — ONDANSETRON 4 MG/1
4 TABLET, ORALLY DISINTEGRATING ORAL EVERY 8 HOURS PRN
Status: DISCONTINUED | OUTPATIENT
Start: 2023-02-28 | End: 2023-02-28 | Stop reason: HOSPADM

## 2023-02-28 RX ORDER — SODIUM CHLORIDE 0.9 % (FLUSH) 0.9 %
5-40 SYRINGE (ML) INJECTION EVERY 12 HOURS SCHEDULED
Status: DISCONTINUED | OUTPATIENT
Start: 2023-02-28 | End: 2023-02-28 | Stop reason: HOSPADM

## 2023-02-28 RX ORDER — MIRTAZAPINE 15 MG/1
7.5 TABLET, FILM COATED ORAL NIGHTLY
Status: DISCONTINUED | OUTPATIENT
Start: 2023-02-28 | End: 2023-02-28 | Stop reason: HOSPADM

## 2023-02-28 RX ORDER — POTASSIUM CHLORIDE 20 MEQ/1
40 TABLET, EXTENDED RELEASE ORAL PRN
Status: DISCONTINUED | OUTPATIENT
Start: 2023-02-28 | End: 2023-02-28 | Stop reason: HOSPADM

## 2023-02-28 RX ADMIN — ENOXAPARIN SODIUM 40 MG: 100 INJECTION SUBCUTANEOUS at 10:19

## 2023-02-28 RX ADMIN — FUROSEMIDE 40 MG: 10 INJECTION, SOLUTION INTRAMUSCULAR; INTRAVENOUS at 18:47

## 2023-02-28 RX ADMIN — MORPHINE SULFATE 4 MG: 4 INJECTION, SOLUTION INTRAMUSCULAR; INTRAVENOUS at 18:47

## 2023-02-28 RX ADMIN — ALBUTEROL SULFATE 2.5 MG: 2.5 SOLUTION RESPIRATORY (INHALATION) at 05:05

## 2023-02-28 RX ADMIN — MORPHINE SULFATE 4 MG: 4 INJECTION, SOLUTION INTRAMUSCULAR; INTRAVENOUS at 16:28

## 2023-02-28 RX ADMIN — PERFLUTREN 1.5 ML: 6.52 INJECTION, SUSPENSION INTRAVENOUS at 14:27

## 2023-02-28 RX ADMIN — FUROSEMIDE 40 MG: 10 INJECTION, SOLUTION INTRAMUSCULAR; INTRAVENOUS at 10:19

## 2023-02-28 RX ADMIN — SODIUM CHLORIDE, PRESERVATIVE FREE 10 ML: 5 INJECTION INTRAVENOUS at 10:19

## 2023-02-28 ASSESSMENT — ENCOUNTER SYMPTOMS: SHORTNESS OF BREATH: 1

## 2023-02-28 ASSESSMENT — PAIN DESCRIPTION - LOCATION: LOCATION: GENERALIZED

## 2023-02-28 ASSESSMENT — PAIN SCALES - GENERAL: PAINLEVEL_OUTOF10: 8

## 2023-02-28 NOTE — ACP (ADVANCE CARE PLANNING)
Advance Care Planning   Healthcare Decision Maker:  Unknown - no HCPOA available in eMAR        Pt was admitted to a hospital in [place] on 12/20/2018 and the following notes were documented during that stay:  Donnymicky Rickie - 12/20/2018 11:55 AM EST  . Maximiliano Kline Patient reports that 30 years ago today he lost 5 children. His wife passed 2 years ago. .. Marquis Maciel - 12/20/2018 10:30 AM EST  . Maximiliano Kline Pt also indicated he has no family. ..

## 2023-02-28 NOTE — CARE COORDINATION
Discharge Planning Note:    Patient came into hospital from [facility]. Ravi Akins from [facility] states that they were getting ready to transfer patient to [facility] through the [place], prior to this hospital admission. She states patient would not come back to [facility], but would be transferred to [facility]. States she does not have direct contact number fro anyone for CM to spoeak with at [facility]. CM placed call to [facility] and had to [facility] for facility admissions.        [facility]  [address] [place] [address]  [phone]    Electronically signed by Elieser Lemus RN on 2/28/2023 at 10:46 AM

## 2023-02-28 NOTE — ED PROVIDER NOTES
[facility]        Pt Name: Rock Del Rosario  MRN: [id number]  [place] 1945  Date of evaluation: 2/27/2023  Provider: MICHELLE Valdez CNP  PCP: No primary care provider on file. Note Started: 12:53 AM EST 2/28/23       I have seen and evaluated this patient with my supervising physician pendlucille      [address]       Chief Complaint   Patient presents with    Altered Mental Status     Pt arrives int h ED via [address] [place] [address],[address] Floor EMS from [place] side   Report from ems stated he has been altered at [facility] with know known onset. Pt has hx of dementia but is not at baseline according to [facility]       HISTORY OF PRESENT ILLNESS: 1 or more Elements     History from : EMS    Limitations to history : Altered Mental Status    Rock Del Rosario is a 69 y.o. male who presents to the emergency department with complaint of altered mental status. The patient is from [address] [place] [address] home, per EMS, the patient was found to be altered but uncertain last time known well. They did report that he appears confused and more lethargic than normal per nursing home staff. Patient is ill-appearing, hypoxic and in respiratory distress upon arrival.  States that he is feeling weak, he is unable to contribute meaningful history. Nursing Notes were all reviewed and agreed with or any disagreements were addressed in the HPI. REVIEW OF SYSTEMS :      Review of Systems   Unable to perform ROS: Mental status change   Respiratory:  Positive for shortness of breath. Positives and Pertinent negatives as per HPI.      SURGICAL HISTORY     Past Surgical History:   Procedure Laterality Date    APPENDECTOMY      CARDIAC SURGERY      stents  x3    CORONARY ANGIOPLASTY WITH STENT PLACEMENT      most recent 8/2017 total of 3 stents    INSERTABLE CARDIAC MONITOR  07/2017       CURRENTMEDICATIONS       Previous Medications    ACETAMINOPHEN (TYLENOL) 325 MG TABLET    Take 650 mg by mouth every 6 hours as needed for Pain    ALBUTEROL (PROVENTIL) (2.5 MG/3ML) 0.083% NEBULIZER SOLUTION    Take 2.5 mg by nebulization 3 times daily as needed for Wheezing    ASPIRIN 81 MG CHEWABLE TABLET    Take 81 mg by mouth daily    ASPIRIN 81 MG EC TABLET    Take 81 mg by mouth    ATORVASTATIN (LIPITOR) 80 MG TABLET    Take 80 mg by mouth daily    CELECOXIB (CELEBREX) 200 MG CAPSULE    Take 200 mg by mouth    DIVALPROEX (DEPAKOTE SPRINKLE) 125 MG DR CAPSULE    Take 125 mg by mouth in the morning, at noon, and at bedtime    FLUOXETINE (PROZAC) 20 MG CAPSULE    Take 20 mg by mouth daily    FOLIC ACID (FOLVITE) 1 MG TABLET    Take 1 mg by mouth daily    FUROSEMIDE (LASIX) 20 MG TABLET    Take 20 mg by mouth daily    LIDOCAINE (LIDODERM) 5 %    Apply topically    LOSARTAN (COZAAR) 50 MG TABLET    Take 50 mg by mouth    LOSARTAN (COZAAR) 50 MG TABLET    Take 50 mg by mouth daily    MELATONIN (RA MELATONIN) 3 MG TABS TABLET    Take 1 tablet by mouth nightly as needed (difficulty sleeping)    METOPROLOL TARTRATE (LOPRESSOR) 25 MG TABLET    Take 12.5 mg by mouth 2 times daily    MIRTAZAPINE (REMERON) 15 MG TABLET    Take 7.5 mg by mouth nightly    NITROGLYCERIN (NITROSTAT) 0.4 MG SL TABLET    0.4 mg    ONDANSETRON (ZOFRAN) 4 MG TABLET    Take 4 mg by mouth every 8 hours as needed for Nausea or Vomiting    PANTOPRAZOLE (PROTONIX) 40 MG TABLET    Take 40 mg by mouth in the morning and at bedtime    QUETIAPINE (SEROQUEL) 25 MG TABLET    Take 12.5 mg by mouth every morning (before breakfast)    QUETIAPINE (SEROQUEL) 25 MG TABLET    Take 12.5 mg by mouth daily (before lunch)    QUETIAPINE (SEROQUEL) 25 MG TABLET    Take 25 mg by mouth at bedtime    ROSUVASTATIN (CRESTOR) 10 MG TABLET    Take 10 mg by mouth    ROSUVASTATIN (CRESTOR) 10 MG TABLET    Take 10 mg by mouth daily    VITAMIN B-1 (THIAMINE) 100 MG TABLET    Take 100 mg by mouth daily       ALLERGIES     Morphine and related    FAMILYHISTORY       Family History   Problem Relation Age of Onset    Alcohol Abuse Father     Other Father         COPD        SOCIAL HISTORY       Social History     Tobacco Use    Smoking status: Former     Types: Cigarettes     Quit date: 1969     Years since quittin.2    Smokeless tobacco: Never   Substance Use Topics    Alcohol use: No    Drug use: No       SCREENINGS                         CIWA Assessment  BP: (!) 159/102  Heart Rate: 81           PHYSICAL EXAM  1 or more Elements     ED Triage Vitals [23]   BP Temp Temp Source Heart Rate Resp SpO2 Height Weight   (!) 153/126 97.8 °F (36.6 °C) Oral 78 24 94 % -- --       Physical Exam  Vitals and nursing note reviewed. Constitutional:       Appearance: He is well-developed. He is not diaphoretic. HENT:      Head: Normocephalic and atraumatic. Right Ear: External ear normal.      Left Ear: External ear normal.   Eyes:      General:         Right eye: No discharge. Left eye: No discharge. Neck:      Vascular: No JVD. Cardiovascular:      Rate and Rhythm: Normal rate. Pulmonary:      Effort: Respiratory distress present. Breath sounds: Rales present. Abdominal:      Palpations: Abdomen is soft. Tenderness: There is no abdominal tenderness. Musculoskeletal:         General: Normal range of motion. Right lower leg: Edema present. Left lower leg: Edema present. Skin:     General: Skin is warm and dry. Coloration: Skin is not pale. Neurological:      Mental Status: He is lethargic.       Comments: Awakens easily to vocal and tactile stimuli, confused, uncertain of baseline   Psychiatric:         Behavior: Behavior normal.           DIAGNOSTIC RESULTS   LABS:    Labs Reviewed   COVID-19, RAPID - Abnormal; Notable for the following components:       Result Value    SARS-CoV-2, NAAT Indeterminate (*)     All other components within normal limits   COMPREHENSIVE METABOLIC PANEL W/ REFLEX TO MG FOR LOW K - Abnormal; Notable for the following components:    Glucose 118 (*)     BUN 28 (*)     All other components within normal limits   BRAIN NATRIURETIC PEPTIDE - Abnormal; Notable for the following components:    Pro-BNP 38,439 (*)     All other components within normal limits   TROPONIN - Abnormal; Notable for the following components:    Troponin 0.03 (*)     All other components within normal limits   BLOOD GAS, VENOUS - Abnormal; Notable for the following components:    pO2, [place] 70.6 (*)     Carboxyhemoglobin 3.6 (*)     All other components within normal limits   VALPROIC ACID LEVEL, TOTAL - Abnormal; Notable for the following components:    Valproic Acid Lvl 32.9 (*)     All other components within normal limits   CBC WITH AUTO DIFFERENTIAL - Abnormal; Notable for the following components:    Hemoglobin 12.4 (*)     Hematocrit 39.3 (*)     RDW 22.2 (*)     All other components within normal limits   POCT GLUCOSE - Abnormal; Notable for the following components:    POC Glucose 141 (*)     All other components within normal limits   COVID-19, RAPID   CULTURE, BLOOD 1   CULTURE, BLOOD 2   LACTATE, SEPSIS   PROCALCITONIN       When ordered only abnormal lab results are displayed. All other labs were within normal range or not returned as of this dictation.    EKG: When ordered, EKG's are interpreted by the Emergency Department Physician in the absence of a cardiologist.  Please see their note for interpretation of EKG.    RADIOLOGY:   Non-plain film images such as CT, Ultrasound and MRI are read by the radiologist. Plain radiographic images are visualized and preliminarily interpreted by the ED Provider with the below findings:        Interpretation per the Radiologist below, if available at the time of this note:    CT CHEST PULMONARY EMBOLISM W CONTRAST   Final Result   Motion artifact limiting the exam.  Within the limitations of the exam, no   obvious acute pulmonary embolus is seen.      Mildly dilated thoracic aorta with  no aneurysm      Moderate bibasilar pleural effusions with adjacent moderate atelectasis and   consolidation along the lung bases extending into the upper chest.         CT Head W/O Contrast   Final Result   Motion artifact limiting the exam.  Within the limitations of the exam, no   obvious acute intracranial abnormality is seen. Mild atrophy and mild chronic microischemic changes scattered in the deep   white matter which is unchanged. XR CHEST PORTABLE   Final Result      Consolidation in the left lung and a left pleural effusion           CT Head W/O Contrast    Result Date: 2/28/2023  EXAMINATION: CT OF THE HEAD WITHOUT CONTRAST  2/27/2023 11:26 pm TECHNIQUE: CT of the head was performed without the administration of intravenous contrast. Automated exposure control, iterative reconstruction, and/or weight based adjustment of the mA/kV was utilized to reduce the radiation dose to as low as reasonably achievable. COMPARISON: 01/01/2023 HISTORY: ORDERING SYSTEM PROVIDED HISTORY: altered mental status TECHNOLOGIST PROVIDED HISTORY: Reason for exam:->altered mental status Has a \"code stroke\" or \"stroke alert\" been called? ->No Decision Support Exception - unselect if not a suspected or confirmed emergency medical condition->Emergency Medical Condition (MA) Reason for Exam: altered mental status Relevant Medical/Surgical History: Altered Mental Status (Pt arrives int h ED via [address] [place] [address],[address] Floor EMS from [facility] FINDINGS: BRAIN/VENTRICLES: There is motion artifact throughout the exam.  The ventricles are mildly enlarged and there is diffuse mild prominence of the cortical sulci. There is mild periventricular low density bilaterally which is unchanged. No intracranial hemorrhage or edema is seen. There is no extra-axial fluid collection or mass. ORBITS: The visualized portion of the orbits demonstrate no acute abnormality.  SINUSES: The visualized paranasal sinuses and mastoid air cells demonstrate no acute abnormality. SOFT TISSUES/SKULL:  No acute abnormality of the visualized skull or soft tissues. Motion artifact limiting the exam.  Within the limitations of the exam, no obvious acute intracranial abnormality is seen. Mild atrophy and mild chronic microischemic changes scattered in the deep white matter which is unchanged. XR CHEST PORTABLE    Result Date: 2/27/2023  EXAMINATION: ONE XRAY VIEW OF THE CHEST 2/27/2023 9:14 pm COMPARISON: 01/01/2023 HISTORY: ORDERING SYSTEM PROVIDED HISTORY: shortness of breath TECHNOLOGIST PROVIDED HISTORY: Reason for exam:->shortness of breath Reason for Exam: shortness of breath FINDINGS: Heart size is globular and enlarged without change. Aorta is tortuous. Mediastiu=num is not widened. .  Lungs are normally expanded. Consolidation throughout the left lung. .  There is also fullness of the right hilum. Left pleural effusions. Mild spondylosis     Consolidation in the left lung and a left pleural effusion     CT CHEST PULMONARY EMBOLISM W CONTRAST    Result Date: 2/28/2023  EXAMINATION: CTA OF THE CHEST 2/27/2023 11:28 pm TECHNIQUE: CTA of the chest was performed after the administration of intravenous contrast.  Multiplanar reformatted images are provided for review. MIP images are provided for review. Automated exposure control, iterative reconstruction, and/or weight based adjustment of the mA/kV was utilized to reduce the radiation dose to as low as reasonably achievable. COMPARISON: None. HISTORY: ORDERING SYSTEM PROVIDED HISTORY: respiratory failure, left peural effusion TECHNOLOGIST PROVIDED HISTORY: Reason for exam:->respiratory failure, left peural effusion Decision Support Exception - unselect if not a suspected or confirmed emergency medical condition->Emergency Medical Condition (MA) Reason for Exam: respiratory failure, left peural effusion Relevant Medical/Surgical History:  Altered Mental Status (Pt arrives int h ED via [address] [place] [address],[address] EMS from [place] side FINDINGS: There is moderate motion artifact throughout the exam. Pulmonary Arteries: Pulmonary arteries are adequately opacified for evaluation. No evidence of intraluminal filling defect to suggest pulmonary embolism. Main pulmonary artery is normal in caliber. Mediastinum: No evidence of mediastinal lymphadenopathy. The heart and pericardium demonstrate no acute abnormality. There is no acute abnormality of the thoracic aorta. Lungs/pleura: There are moderate pleural effusions layering posteriorly extending into the upper chest with adjacent moderate atelectasis and consolidations posteriorly extending into the upper chest.. No pulmonary nodule or mass is seen. No endobronchial lesions are seen Upper Abdomen: Limited images of the upper abdomen are unremarkable. Soft Tissues/Bones: No acute bone or soft tissue abnormality. Motion artifact limiting the exam.  Within the limitations of the exam, no obvious acute pulmonary embolus is seen. Mildly dilated thoracic aorta with no aneurysm Moderate bibasilar pleural effusions with adjacent moderate atelectasis and consolidation along the lung bases extending into the upper chest.       No results found. PROCEDURES   Unless otherwise noted below, none     Procedures    CRITICAL CARE TIME (.cctime)   There was a high probability of life-threatening clinical deterioration in the patient's condition requiring my urgent intervention. I personally saw the patient and independently provided 41 minutes of non-concurrent critical care out of the total shared critical care time provided, excluding separately reportable procedures. PAST MEDICAL HISTORY      has a past medical history of CAD (coronary artery disease), Cardiac abnormality, Chronic kidney disease, Dementia ([address] Hyperlipidemia, Hypertension, Liver failure ([facility] [address] and MI (myocardial infarction) ([facility] [address] (03/2017).      Chronic Conditions affecting Care: has a past medical history of CAD (coronary artery disease), Cardiac abnormality, Chronic kidney disease, Dementia ([facility] [address] Hyperlipidemia, Hypertension, Liver failure ([facility] [address] and MI (myocardial infarction) ([facility] [address] (03/2017). EMERGENCY DEPARTMENT COURSE and DIFFERENTIAL DIAGNOSIS/MDM:   Vitals:    Vitals:    02/28/23 0115 02/28/23 0130 02/28/23 0200 02/28/23 0207   BP: (!) 168/112 (!) 165/115 (!) 159/102    Pulse: 80 82 79 81   Resp: 21 28 22 22   Temp:       TempSrc:       SpO2: 100%   99%       Patient was given the following medications:  Medications   ipratropium-albuterol (DUONEB) nebulizer solution 1 ampule (1 ampule Inhalation Given 2/27/23 2039)   fentaNYL (SUBLIMAZE) injection 25 mcg (25 mcg IntraVENous Given 2/27/23 2202)   ondansetron (ZOFRAN) injection 4 mg (4 mg IntraVENous Given 2/27/23 2201)   furosemide (LASIX) injection 60 mg (60 mg IntraVENous Given 2/27/23 2225)   iopamidol (ISOVUE-370) 76 % injection 75 mL (75 mLs IntraVENous Given 2/27/23 2357)   LORazepam (ATIVAN) injection 1 mg (1 mg IntraVENous Given 2/27/23 2357)             Is this patient to be included in the SEP-1 Core Measure due to severe sepsis or septic shock? No   Exclusion criteria - the patient is NOT to be included for SEP-1 Core Measure due to: Infection is not suspected    CONSULTS: (Who and What was discussed)  IP CONSULT TO HEART FAILURE NURSE/COORDINATOR  IP CONSULT TO DIETITIAN  IP CONSULT TO CARDIOLOGY  Discussion with Other Profesionals : Admitting Team [facility]    Social Determinants : None    Records Reviewed : None    CC/HPI Summary, DDx, ED Course, and Reassessment:     Briefly this is a 80-year-old malewho presents to the emergency department with complaint of altered mental status. The patient is from [address] [place] [address] home, per EMS, the patient was found to be altered but uncertain last time known well. They did report that he appears confused and more lethargic than normal per nursing home staff.   Patient is ill-appearing, hypoxic and in respiratory distress upon arrival.  States that he is feeling weak, he is unable to contribute meaningful history. BNP elevated greater than 38,000. Troponin 0.03. Lactate unremarkable. CBC and CMP unremarkable. CT CHEST PULMONARY EMBOLISM W CONTRAST (Final result)  Result time 02/28/23 00:43:23  Final result by Dilshad Lakhani MD (02/28/23 00:43:23)                Impression:    Motion artifact limiting the exam.  Within the limitations of the exam, no   obvious acute pulmonary embolus is seen. Mildly dilated thoracic aorta with no aneurysm     Moderate bibasilar pleural effusions with adjacent moderate atelectasis and   consolidation along the lung bases extending into the upper chest.               Patient given fentanyl, Lasix, DuoNeb, Ativan, and Zofran in the emergency department. Plan to admit to hospitalist services for acute respiratory failure with hypoxia, acute congestive heart failure, encephalopathy with history of dementia. Disposition Considerations (include 1 Tests not done, Shared Decision Making, Pt Expectation of Test or Tx.):       Shared decision making between myself and attending physician. I did consider CT of the abdomen and pelvis. I am the Primary Clinician of Record. FINAL IMPRESSION      1. Acute respiratory failure with hypoxia ([address]    2. Acute congestive heart failure, unspecified heart failure type ([address]    3. Acute encephalopathy    4. History of dementia          DISPOSITION/PLAN     DISPOSITION Admitted 02/28/2023 02:30:17 AM      PATIENT REFERRED TO:  No follow-up provider specified.     DISCHARGE MEDICATIONS:  New Prescriptions    No medications on file       DISCONTINUED MEDICATIONS:  Discontinued Medications    No medications on file              (Please note that portions of this note were completed with a voice recognition program.  Efforts were made to edit the dictations but occasionally words are mis-transcribed.)    MICHELLE Montalvo - CNP (electronically signed)           Ani Moctezuma, MICHELLE - LOUISE  02/28/23 [id number]

## 2023-02-28 NOTE — PLAN OF CARE
Problem: Safety - Violent/Self-destructive Restraint  Goal: Remains free of injury from restraints (Restraint for Violent/Self-Destructive Behavior)  Description: INTERVENTIONS:  1. Determine that de-escalation and other, less restrictive measures have been tried or would not be effective before applying the restraint  2. Identify and document the criteria for restraint  3. Evaluate the patient's condition at the time of restraint application  4. Inform patient/family regarding the reason for restraint/seclusion  5. Q2H: Monitor comfort, nutrition and hydration needs  6. Q15M: Perform safety checks including skin, circulation, sensory, respiratory and psychological status  7. Ensure continuous observation  8. Identify and implement measures to help patient regain control, assess readiness for release and initiate progressive release per policy  Outcome: Not Progressing     Problem: Discharge Planning  Goal: Discharge to home or other facility with appropriate resources  Outcome: Not Progressing     Problem: Skin/Tissue Integrity  Goal: Absence of new skin breakdown  Description: 1. Monitor for areas of redness and/or skin breakdown  2. Assess vascular access sites hourly  3. Every 4-6 hours minimum:  Change oxygen saturation probe site  4. Every 4-6 hours:  If on nasal continuous positive airway pressure, respiratory therapy assess nares and determine need for appliance change or resting period. Outcome: Not Progressing     Problem: Safety - Adult  Goal: Free from fall injury  Outcome: Not Progressing     Problem: Confusion  Goal: Confusion, delirium, dementia, or psychosis is improved or at baseline  Description: INTERVENTIONS:  1. Assess for possible contributors to thought disturbance, including medications, impaired vision or hearing, underlying metabolic abnormalities, dehydration, psychiatric diagnoses, and notify attending LIP  2. [place] high risk fall precautions, as indicated  3.  Provide frequent short contacts to provide reality reorientation, refocusing and direction  4. Decrease environmental stimuli, including noise as appropriate  5. Monitor and intervene to maintain adequate nutrition, hydration, elimination, sleep and activity  6. If unable to ensure safety without constant attention obtain sitter and review sitter guidelines with assigned personnel  7.  Initiate Psychosocial CNS and Spiritual Care consult, as indicated  Outcome: Not Progressing     Problem: Chronic Conditions and Co-morbidities  Goal: Patient's chronic conditions and co-morbidity symptoms are monitored and maintained or improved  Outcome: Not Progressing

## 2023-02-28 NOTE — ED NOTES
Pt transported to [facility] in stable condition on bedside monitor in soft wrist restraints with ED tech and RN      Abhijeet Curiel RN  02/28/23 [id number]

## 2023-02-28 NOTE — PLAN OF CARE
Patient seen and examined by one of her partners earlier today. I agree with their assessment and plan and will continue to follow while in the hospital.    Palliative care consulted due to patient's worsening status.   Patient CODE STATUS changed to DNR Rainer Davis MD

## 2023-02-28 NOTE — ED NOTES
Pt placed in soft wrist restraints to prevent harm to themselves via ripping out IV and catheter      Jessica Avalos RN  02/28/23 [id number]

## 2023-02-28 NOTE — PROGRESS NOTES
02/28/23 0702   RT Protocol   History Pulmonary Disease 0   Respiratory pattern 0   Breath sounds 2   Cough 0   Indications for Bronchodilator Therapy On home bronchodilators   Bronchodilator Assessment Score 2

## 2023-02-28 NOTE — CARE COORDINATION
Discharge Planning Assessment  Readmission Risk 18%  RN/SW discharge planner met with patient/ (and family member) to discuss reason for admission, current living situation, and potential needs at the time of discharge    Demographics/Insurance verified Yes/No Yes    Current type of dwelling: LTC    Patient from ECF/SW confirmed with: [facility] [place] [address] arrangements: [facility]    Level of function/Support: Assistance from facility staff    PCP: Facility physician    Active with any community resources/agencies/skilled home care: None    Medication compliance issues: Facility assists with medication    Financial issues that could impact healthcare: None reported        Tentative discharge plan:  CM spoke with Golden De Los Santos from [facility] who states that prior to coming to the hospital, [facility] was transferring the patient to [address] [place] [address], through the [place]. Discussed with patient and/or family that on the day of discharge home tentative time of discharge will be between 10 AM and noon.     Transportation at the time of discharge:  TBD    Electronically signed by Carlos Uriostegui RN on 2/28/2023 at 10:38 AM

## 2023-02-28 NOTE — DISCHARGE INSTR - DIET
Heart Failure Nutrition Therapy  This diet will help you feel better and support your heart by reducing symptoms of fluid retention, shortness of breath and swelling. You should focus on:  Limiting sodium in your diet by reading labels and limiting foods high in sodium. Limit your daily sodium intake to 2,000 mg per day. Select foods with 140 mg of sodium or less per serving. Foods with more than 300 mg of sodium per serving may not fit into a reduced-sodium meal plan. Check serving sizes. If you eat more than 1 serving, you will get more sodium than the amount listed. Limiting fluid in your diet. Ask your doctor how much fluid you can have per day  Remember foods that are liquid at room temperature such as popsicles, soup, ice cream and Jell-O are fluids. Checking your weight to make sure you're not retaining too much fluid. Weigh yourself every morning. If you gain 3 or more pounds in one day or 5 pounds within 1 week, call your doctor. Foods to choose and avoid:  Avoid processed foods. Eat more fresh foods. Fresh and frozen fruits and vegetables are good choices. Choose fresh meats. Avoid processed meats such as webb, sausage and hot dogs. Do not add salt to your food while cooking or at the table. Try dry or fresh herbs, pepper, lemon juice, or a sodium-free seasoning blend such as Mrs. Dash to add flavor to food. Do not use a salt substitute. Use caution at [employer] foods are high in sodium. Ask for your food to be cooked without salt and request sauces and dressing to come on the side. 

## 2023-02-28 NOTE — PROGRESS NOTES
Speech Language Pathology  Attempt  Sabino Reyna  1945    SLP attempted to see the pt for swallow evaluation. Pt's chart reviewed and consultation completed with the pt's RN. Pt's RN reports that the pt has transferred to hospice services and is not appropriate for swallow eval at this time. Order discontinued. Rick Rodriguez M.S. González Brought #SP. [id number] [facility]

## 2023-02-28 NOTE — PROGRESS NOTES
Spoke with sister Dylan Etienne on the phone, suggested we contact spiritual services to have last rights read to patient as he was a \"deeply Zoroastrianism man\" per his sister, all questions asked and answered, pt sister verbalized understanding.

## 2023-02-28 NOTE — PROGRESS NOTES
Pt is extremely confused, unable to express coherent words at this time, mainly only responsive to painful stimuli, pt unable to take any PO meds at this time, 1850 of urine emptied from charles, see flowsheets, VSS, notified MD of pt condition, spoke with palliative and informed pt is to be a DNR-CC and is being referred to hospice, pt refuses to allow repositioning and turns himself to whatever position he appears to be most comfortable in, bilateral soft wrist restraints checked and documented on,  see flowsheets, PCR covid test collected from [facility] and sent to lab at this time. Call light within reach, brakes locked, bed in lowest position.

## 2023-02-28 NOTE — DISCHARGE INSTR - COC
Continuity of Care Form    Patient Name: Roro Reese   :  1945  MRN:  [id number]    Admit date:  2023  Discharge date:  2023    Code Status Order: DNR-CC   Advance Directives:     Admitting Physician:  Caryl Bobo MD  PCP: No primary care provider on file. Discharging Nurse: Alla Ortega Unit/Room#: X8A-2253/8051-87  Discharging Unit Phone Number: [phone]    Emergency Contact:   Extended Emergency Contact Information  Primary Emergency Contact: [address] [place] [address] Phone: [phone]  Mobile Phone: [phone]  Relation: Niece/Nephew  Preferred language: English   needed? No  Secondary Emergency Contact: [facility] Phone: [phone]  Mobile Phone: [phone]  Relation: Other Relative  Preferred language: English   needed?  No    Past Surgical History:  Past Surgical History:   Procedure Laterality Date    APPENDECTOMY      CARDIAC SURGERY      stents  x3    CORONARY ANGIOPLASTY WITH STENT PLACEMENT      most recent 2017 total of 3 stents    INSERTABLE CARDIAC MONITOR  2017       Immunization History:   Immunization History   Administered Date(s) Administered    Influenza 03/15/2017    Influenza Virus Vaccine 2018, 2018    Pneumococcal Conjugate 13-valent ([id number]) 2018    Pneumococcal Polysaccharide ([id number]) 03/15/2017       Active Problems:  Patient Active Problem List   Diagnosis Code    Hypertensive urgency I16.0    Chest pain R07.9    History of coronary artery disease Z86.79    Coronary artery disease I25.10    Noncompliance Z91.199    Ascending aorta dilatation (HCC) I77.810    Angina at rest ([facility] [address] I20.8    Essential hypertension I10    Hyperkalemia E87.5    Hyperlipidemia E78.5    Obesity E66.9    HLD (hyperlipidemia) E78.5    Chest pain R07.9    HTN (hypertension) I10    CAD (coronary artery disease) I25.10    Coronary artery disease involving native coronary artery of native heart I25.10    Acute on chronic diastolic (congestive) heart failure (HCC) I50.33    Advanced dementia F03. C0       Isolation/Infection:   Isolation            Contact          Patient Infection Status       Infection Onset Added Last Indicated Last Indicated By Review Planned Expiration Resolved Resolved By    None active    Resolved    COVID-19 (Rule Out) 02/28/23 02/28/23 02/28/23 Respiratory Panel, Molecular, with COVID-19 (Restricted: peds pts or suitable admitted adults) (Ordered)   02/28/23 Rule-Out Test Canceled    COVID-19 02/27/23 02/27/23 02/27/23 COVID-19, Rapid   02/28/23 Serenity Chanel, RN    Repeat test negative    COVID-19 (Rule Out) 02/27/23 02/27/23 02/27/23 COVID-19, Rapid (Ordered)   02/27/23 Rule-Out Test Resulted            Nurse Assessment:  Last Vital Signs: BP (!) 157/96   Pulse 77   Temp 96.9 °F (36.1 °C)   Resp 18   Wt 185 lb 9.6 oz (84.2 kg)   SpO2 96%   BMI 25.17 kg/m²     Last documented pain score (0-10 scale): Pain Level: 8  Last Weight:   Wt Readings from Last 1 Encounters:   02/28/23 185 lb 9.6 oz (84.2 kg)     Mental Status:  disoriented    IV Access:  - Peripheral IV - site  R hand, insertion date: 02/27/2023    Nursing Mobility/ADLs:  Walking   Dependent  Transfer  Dependent  Bathing  Dependent  Dressing  Dependent  Toileting  Dependent  Feeding  Dependent  Med Admin  Dependent  Med Delivery    NPO, IV    Wound Care Documentation and Therapy:        Elimination:  Continence: Bowel: No  Bladder: No  Urinary Catheter: Insertion Date: 02/27/2023    Colostomy/Ileostomy/Ileal Conduit: No       Date of Last BM: 02/27/2023    Intake/Output Summary (Last 24 hours) at 2/28/2023 1807  Last data filed at 2/28/2023 1632  Gross per 24 hour   Intake 10 ml   Output 5850 ml   Net -5840 ml     I/O last 3 completed shifts:   In: 0   Out: 2800 [Urine:2800]    Safety Concerns:     Sundowners [place], History of Falls (last 30 days), and Aspiration Risk    Impairments/Disabilities: Speech    Nutrition Therapy:  Current Nutrition Therapy:   - NPO    Routes of Feeding: None  Liquids: No Liquids  Daily Fluid Restriction: no  Last Modified Barium Swallow with Video (Video Swallowing Test): not done    Treatments at the Time of Hospital Discharge:   Respiratory Treatments: N/A  Oxygen Therapy:  is not on home oxygen therapy. Ventilator:    - No ventilator support    Rehab Therapies: N/A  Weight Bearing Status/Restrictions: No weight bearing restrictions  Other Medical Equipment (for information only, NOT a DME order):  N/A, hospital bed  Other Treatments: N/A    Patient's personal belongings (please select all that are sent with patient):  None    RN SIGNATURE:  Electronically signed by Ludmila Mukherjee RN on 2/28/23 at 6:40 PM EST    CASE MANAGEMENT/SOCIAL WORK SECTION    Inpatient Status Date: ***    Readmission Risk Assessment Score:  Readmission Risk              Risk of Unplanned Readmission:  18           Discharging to Facility/ Agency   Name:   Address:  Phone:  Fax:    Dialysis Facility (if applicable)   Name:  Address:  Dialysis Schedule:  Phone:  Fax:    / signature: {Esignature:[id number]}    PHYSICIAN SECTION    Prognosis: Poor    Condition at Discharge: Terminal    Rehab Potential (if transferring to Rehab): Poor    Recommended Labs or Other Treatments After Discharge:     Physician Certification: I certify the above information and transfer of Raissa Aquino  is necessary for the continuing treatment of the diagnosis listed and that he requires Hospice for greater 30 days.      Update Admission H&P: No change in H&P    PHYSICIAN SIGNATURE:  Electronically signed by Cyn Smith MD on 2/28/23 at 6:08 PM EST

## 2023-02-28 NOTE — H&P
Hospital Medicine History & Physical      PCP: No primary care provider on file. Date of Admission: 2/27/2023    Date of Service: Pt seen/examined on 2/28/2023  and Admitted to Inpatient with expected LOS greater than two midnights due to medical therapy. Chief Complaint:    Chief Complaint   Patient presents with    Altered Mental Status     Pt arrives int h ED via [facility] from [place] side   Report from ems stated he has been altered at [facility] with know known onset. Pt has hx of dementia but is not at baseline according to [facility]        History Of Present Illness: The patient is a 69 y.o. male with history of advanced dementia, CAD, hypertension, hyperlipidemia who is resident in nursing home at [facility] and presented with generalized malaise weakness, increased confusion than baseline and noted to have bilateral pitting edema with chest x-ray noted for pleural effusions and elevated BNP of 38,439. Patient not able to verbalize and remains pleasantly confused. In the ER he received a dose of Lasix with good diuresis. He still has some wheezing likely cardiac wheeze    Past Medical History:        Diagnosis Date    CAD (coronary artery disease)     Cardiac abnormality     Chronic kidney disease     Dementia (HCC)     Hyperlipidemia     Hypertension     Liver failure (HCC)     MI (myocardial infarction) ([facility] [address] 03/2017       Past Surgical History:        Procedure Laterality Date    APPENDECTOMY      CARDIAC SURGERY      stents  x3    CORONARY ANGIOPLASTY WITH STENT PLACEMENT      most recent 8/2017 total of 3 stents    INSERTABLE CARDIAC MONITOR  07/2017       Medications Prior to Admission:    Prior to Admission medications    Medication Sig Start Date End Date Taking?  Authorizing Provider   albuterol (PROVENTIL) (2.5 MG/3ML) 0.083% nebulizer solution Take 2.5 mg by nebulization 3 times daily as needed for Wheezing   Yes Historical Provider, MD   atorvastatin (LIPITOR) 80 MG tablet Take 80 mg by mouth daily   Yes Historical Provider, MD   divalproex (DEPAKOTE SPRINKLE) 125 MG DR capsule Take 125 mg by mouth in the morning, at noon, and at bedtime   Yes Historical Provider, MD   folic acid (FOLVITE) 1 MG tablet Take 1 mg by mouth daily   Yes Historical Provider, MD   furosemide (LASIX) 20 MG tablet Take 20 mg by mouth daily   Yes Historical Provider, MD   metoprolol tartrate (LOPRESSOR) 25 MG tablet Take 12.5 mg by mouth 2 times daily   Yes Historical Provider, MD   mirtazapine (REMERON) 15 MG tablet Take 7.5 mg by mouth nightly   Yes Historical Provider, MD   pantoprazole (PROTONIX) 40 MG tablet Take 40 mg by mouth in the morning and at bedtime   Yes Historical Provider, MD   FLUoxetine (PROZAC) 20 MG capsule Take 20 mg by mouth daily   Yes Historical Provider, MD   QUEtiapine (SEROQUEL) 25 MG tablet Take 12.5 mg by mouth every morning (before breakfast)   Yes Historical Provider, MD   QUEtiapine (SEROQUEL) 25 MG tablet Take 12.5 mg by mouth daily (before lunch)   Yes Historical Provider, MD   QUEtiapine (SEROQUEL) 25 MG tablet Take 25 mg by mouth at bedtime   Yes Historical Provider, MD   vitamin B-1 (THIAMINE) 100 MG tablet Take 100 mg by mouth daily   Yes Historical Provider, MD   acetaminophen (TYLENOL) 325 MG tablet Take 650 mg by mouth every 6 hours as needed for Pain   Yes Historical Provider, MD   ondansetron (ZOFRAN) 4 MG tablet Take 4 mg by mouth every 8 hours as needed for Nausea or Vomiting   Yes Historical Provider, MD   rosuvastatin (CRESTOR) 10 MG tablet Take 10 mg by mouth 9/26/22   Historical Provider, MD   nitroGLYCERIN (NITROSTAT) 0.4 MG SL tablet 0.4 mg 9/19/18   Historical Provider, MD   losartan (COZAAR) 50 MG tablet Take 50 mg by mouth 9/26/22   Historical Provider, MD   lidocaine (LIDODERM) 5 % Apply topically 9/26/22   Historical Provider, MD   celecoxib (CELEBREX) 200 MG capsule Take 200 mg by mouth 9/26/22   Historical Provider, MD   aspirin 81 MG EC tablet Take 81 mg by mouth 9/26/22   Historical Provider, MD   aspirin 81 MG chewable tablet Take 81 mg by mouth daily    Historical Provider, MD   losartan (COZAAR) 50 MG tablet Take 50 mg by mouth daily    Historical Provider, MD   rosuvastatin (CRESTOR) 10 MG tablet Take 10 mg by mouth daily    Historical Provider, MD   melatonin (RA MELATONIN) 3 MG TABS tablet Take 1 tablet by mouth nightly as needed (difficulty sleeping) 1/1/23 1/31/23  Linh Mendez MD       Allergies:  Morphine and related    Social History:  The patient currently resident in nursing home    TOBACCO:   reports that he quit smoking about 53 years ago. His smoking use included cigarettes. He has never used smokeless tobacco.  ETOH:   reports no history of alcohol use. Family History:  Reviewed in detail and negative for DM, Early CAD, Cancer, CVA. Positive as follows:        Problem Relation Age of Onset    Alcohol Abuse Father     Other Father         COPD       REVIEW OF SYSTEMS:   Unable to advance dementia    PHYSICAL EXAM:    BP (!) 165/115   Pulse 81   Temp 97.8 °F (36.6 °C) (Oral)   Resp 22   SpO2 99%     General appearance: No apparent distress appears stated age and cooperative. HEENT Normal cephalic, atraumatic without obvious deformity. Pupils equal, round, and reactive to light. Extra ocular muscles intact. Conjunctivae/corneas clear. Neck: Supple, No jugular venous distention/bruits. Lungs: Clear to auscultation, bilaterally without Rales/Wheezes/Rhonchi   Heart: Regular rate and rhythm with Normal S1/S2 without murmurs, rubs or gallop  Abdomen: Soft, non-tender or non-distended without rigidity or guarding and positive bowel sounds   Extremities: No clubbing, cyanosis, or edema bilaterally. Skin: Skin color, texture, turgor normal.  No rashes or lesions. Neurologic: Alert and oriented X 3, neurovascularly intact with sensory/motor intact upper extremities/lower extremities, bilaterally.   Cranial nerves: II-XII intact, grossly non-focal.  Mental status: Alert, oriented, thought content appropriate. CBC   Recent Labs     02/27/23 2035   WBC 7.3   HGB 12.4*   HCT 39.3*         RENAL  Recent Labs     02/27/23 2035      K 4.1      CO2 26   BUN 28*   CREATININE 1.1     LFT'S  Recent Labs     02/27/23 2035   AST 31   ALT 20   BILITOT 0.8   ALKPHOS 52     COAG  No results for input(s): INR in the last 72 hours. CARDIAC ENZYMES  Recent Labs     02/27/23 2035   TROPONINI 0.03*           Active Hospital Problems    Diagnosis Date Noted    Coronary artery disease [I25.10]      Priority: High    Acute on chronic diastolic (congestive) heart failure (HCC) [I50.33] 02/28/2023     Priority: Medium    Advanced dementia [F03. C0] 02/28/2023     Priority: Medium    CAD (coronary artery disease) [I25.10] 12/20/2018    Essential hypertension [I10]     Hyperlipidemia [E78.5]          ASSESSMENT/PLAN:  69 y.o. male with history of advanced dementia, CAD, hypertension, hyperlipidemia who is resident in nursing home at [facility] and presented with generalized malaise weakness, increased confusion than baseline and noted to have bilateral pitting edema with chest x-ray noted for pleural effusions and elevated BNP of 38,439 found to have acute decompensated diastolic heart failure    Plan:  -IV diuretic with Lasix 40 mg twice a day  -Monitor intake and output  -Monitor daily weight  -Low-salt diet  -Cardiology consult  -CHF coordinator consult  -Monitor renal function and electrolytes and replace as appropriate  -Trend troponins  -get Echocardiogram  -Continue other chronic home medications      DVT Prophylaxis: Subcut enoxaparin  Diet: Cardiac diet  Code Status: Full code         Ab Lopes MD    Thank you No primary care provider on file. for the opportunity to be involved in this patient's care. If you have any questions or concerns please feel free to contact me at [phone].

## 2023-02-28 NOTE — CONSULTS
[address] [facility] [place] 1945    History:  Past Medical History:   Diagnosis Date    CAD (coronary artery disease)     Cardiac abnormality     Chronic kidney disease     Dementia ([facility] [address]     Hyperlipidemia     Hypertension     Liver failure (HCC)     MI (myocardial infarction) ([facility] [address] 03/2017       ECHO:  Echo 2/28/23 pending      From 7/4/20 at [facility]  Summary:   The left ventricular wall motion is normal.   Overall left ventricular ejection fraction is estimated to be 55-60%. The left ventricular function is normal.   There is moderate concentric left ventricular hypertrophy. The left atrium is moderately dilated. The mitral valve leaflets are mildly thickened in appearance. Mild aortic sclerosis present without evidence of stenosis. .       ACE/ARB/ARNi: may need evidence based pending echo and any contraindications  BB: lopressor 25 mg bid-- currently on hold- may need evidence based pending echo and any contraindications  Aldosterone Antagonist: may need evidence based pending echo and any contraindications  SGLT2: may need evidence based pending echo and any contraindications    History of sleep apnea: No    [place] Screen ordered: No-patient with dementia    DM History: No    Last Hospital Admission: 1/11/23 at Health system with GI bleed  Code Status: DNR CC  Discharge plans: patient is LTC at [facility]- possible transfer [facility]    Family Present: no    Susana Conteh was admitted to the hospital with increased shortness of breath. Patient with dementia history and unable to answer questions. No family at bedside. Patient currently NPO. Unsure of dietary restrictions prior to admission. Nursing at facility administered medications. Patients primary care with the [place]. Unable see any documentation in EMR. 1. WEIGHT: Admit Weight: 185 lb 9.6 oz (84.2 kg)      Today  Weight: 185 lb 9.6 oz (84.2 kg)   2.  I/O   Intake/Output Summary (Last 24 hours) at 2/28/2023 1322  Last data filed at 2/28/2023 1215  Gross per 24 hour   Intake 10 ml   Output 4650 ml   Net -4640 ml       Recommendations:   1. Will place HF pathway on nasreen  2.  If patient's diet progresses, recommend no added salt diet       DIAMOND GILLIS RN 2/28/2023 1:22 PM

## 2023-02-28 NOTE — CONSULTS
PALLIATIVE MEDICINE CONSULTATION     Patient name:Jesus Galvan Comment   YXD:[id number]    :1945  Room/Bed:R4T-3967/5916-01   LOS: 0 days         Date of consult:2023    Consult Information  Palliative Medicine Consult performed by: MICHELLE Rg CNP, CNP    Inpatient consult to Palliative Care  Consult performed by: MICHELLE Rg CNP  Consult ordered by: Kenton Lee MD  Reason for consult: [address] and code status             ASSESSMENT/RECOMMENDATIONS     69 y.o. male with AMS and dyspnea with dementia       Symptom Management:  Dyspnea- pt with increased oxygen requirement up to 4L from 2L   AMS-  pt with increased lethargy no longer verbally responding  Goals of Care- talked to pts beatris Milan who is his only family and POA she states that pt is DNR and wants no life sustaining measures. Updated to [facility] we discussed that pt is rapidly declining and that we will refer to Hospice for comfort she states that that's not what she would chose but that is what pt would want for himself. She is on her way to [place] with family but can be reached by phone. Referral to [facility] to talk to family later today or tomorrow. Specifics of an end-of-life/comfort-focused treatment plan were discussed with the family, including (but not limited to) discontinuation of labs, non-palliative medications, and routine vitals, along with the benefits of hospice enrollment. Family asked multiple appropriate questions, all of which were addressed. Family is unanimous in transitioning to intensive comfort treatment. Patient/Family Goals of Care :    talked to pts beatris Milan who is his only family and POA she states that pt is DNR and wants no life sustaining measures. Updated to [facility] we discussed that pt is rapidly declining and that we will refer to Hospice for comfort she states that that's not what she would chose but that is what pt would want for himself.  She is on her way to [place] with family but can be reached by phone. Referral to [facility] to talk to family later today or tomorrow. Disposition/Discharge Plan:   pending    Advance Directives: The patient has the following advanced directives on file:  [address]  [place] [address] [place] [address] Will ACP-Advance Directive ACP-Power of     Not on File Not on File Not on File Not on File            The patient has appointed the following active healthcare agents: The Patient has the following current code status:    Code Status: DNR-CC      Case discussed with: patient, floor RN, Dr Machelle Luna  Thank you for allowing us to participate in the care of this patient. HISTORY     CC: Dyspnea  HPI: The patient is a 69 y.o. male with severe dementia as well as CAD admitted with progressive mental status issues found to have respiratory failure. BNP high and pro calcitonin low suggestive CHF rather than pneumonia. LV function unclear. He is unresponsive and cannot give any history. Palliative Medicine SymptomScreening/ROS:    Review of Systems   Unable to perform ROS: Patient unresponsive       Patient unable to complete full ROS due to current cognitive status. Information that is obtained from nursing and chart. Palliative Performance Scale:     [] 60%  Amb reduced; Sig dz. Can't do hobbies/housework; Intake normal or reduced, Occasional assist; LOC full/confusion   [] 50%  Mainly sit/lie; Extensive disease. Mainly assist, Intake normal or reduced; Occasional assist; LOC full/confusion   [x] 40%  Mainly in bed; Extensive disease; Mainly assist; Intake normal or reduced; Occasional assist; LOC full/confusion   [] 30%  Bed bound, Extensive disease; Total care; Intake reduced; LOC full/confusion   [] 20%  Bed bound; Extensive disease; Total care; Intake minimal; Drowsy/coma   [] 10%  Bed bound; Extensive disease;  Total care; Mouth care only; Drowsy/coma   []  0%   Death       Home med list and hospital medications reviewed in chart as of 2/28/2023     EXAM     Vitals:    02/28/23 1200   BP: (!) 157/96   Pulse: 77   Resp: 18   Temp:    SpO2: 96%       Physical Exam  Constitutional:       Appearance: He is ill-appearing. HENT:      Head: Normocephalic and atraumatic. Nose: Nose normal.      Mouth/Throat:      Mouth: Mucous membranes are dry. Eyes:      Pupils: Pupils are equal, round, and reactive to light. Cardiovascular:      Rate and Rhythm: Normal rate. Pulses: Normal pulses. Heart sounds: No murmur heard. No gallop. Pulmonary:      Effort: Pulmonary effort is normal.   Chest:      Chest wall: Tenderness present. Abdominal:      General: There is distension. Musculoskeletal:      Cervical back: Neck supple. Right lower leg: Edema present. Left lower leg: Edema present. Skin:     General: Skin is dry. Coloration: Skin is pale. Neurological:      Mental Status: He is alert. Mental status is at baseline. Psychiatric:         Mood and Affect: Mood normal.         Behavior: Behavior normal.         Thought Content: Thought content normal.         Judgment: Judgment normal.              OBJECTIVE   BP (!) 157/96   Pulse 77   Temp 96.9 °F (36.1 °C)   Resp 18   Wt 185 lb 9.6 oz (84.2 kg)   SpO2 96%   BMI 25.17 kg/m²   I/O last 3 completed shifts:   In: 0   Out: 2800 [Urine:2800]  I/O this shift:  In: 10 [I.V.:10]  Out: 1850 [Urine:1850]      Palliative Medicine Interventions:    patient/family support  Goals of Care discussions with patient/surrogate  Spiritual Interventions: none             DATA:  Current labs in the epic chart reviewed as of 2/28/2023   Review of previous notes, admits, labs, radiology and testing relevant to this consult done in this chart today 2/28/2023    Data Reviewed related to this consultation:    Review of prior external note(s) from each unique source relevant to today's visit: Hospitalist, Case management  Discussion of management or test with external physician/qualified health care professional: Hospitalist, Case management  Unique test results reviewed: CBC and BMP, imaging      I have spent a total of 75 minutes on: Performing a medical appropriate examination and/or evaluation    Counseling and educating the patient/family/caregiver  Preparing to see the patient (e.g., review of tests)  Referring / communicating with other healthcare professionals including care coordination (not separately reported):  Hospitalist, Case management  Documenting clinical information in the electronic health record              Signed By: Electronically signed by MICHELLE Sánchez CNP on 2/28/2023 at 2:48 PM  Palliative Medicine     February 28, 2023

## 2023-02-28 NOTE — RT PROTOCOL NOTE
RT Inhaler-Nebulizer Bronchodilator Protocol Note    There is a bronchodilator order in the chart from a provider indicating to follow the RT Bronchodilator Protocol and there is an “Initiate RT Inhaler-Nebulizer Bronchodilator Protocol” order as well (see protocol at bottom of note).    CXR Findings:  XR CHEST PORTABLE    Result Date: 2/27/2023  Consolidation in the left lung and a left pleural effusion       The findings from the last RT Protocol Assessment were as follows:   History Pulmonary Disease: None or smoker <15 pack years  Respiratory Pattern: Regular pattern and RR 12-20 bpm  Breath Sounds: Slightly diminished and/or crackles  Cough: Strong, spontaneous, non-productive  Indication for Bronchodilator Therapy: On home bronchodilators  Bronchodilator Assessment Score: 2    Aerosolized bronchodilator medication orders have been revised according to the RT Inhaler-Nebulizer Bronchodilator Protocol below.    Respiratory Therapist to perform RT Therapy Protocol Assessment initially then follow the protocol.  Repeat RT Therapy Protocol Assessment PRN for score 0-3 or on second treatment, BID, and PRN for scores above 3.    No Indications - adjust the frequency to every 6 hours PRN wheezing or bronchospasm, if no treatments needed after 48 hours then discontinue using Per Protocol order mode.     If indication present, adjust the RT bronchodilator orders based on the Bronchodilator Assessment Score as indicated below.  Use Inhaler orders unless patient has one or more of the following: on home nebulizer, not able to hold breath for 10 seconds, is not alert and oriented, cannot activate and use MDI correctly, or respiratory rate 25 breaths per minute or more, then use the equivalent nebulizer order(s) with same Frequency and PRN reasons based on the score.  If a patient is on this medication at home then do not decrease Frequency below that used at home.    0-3 - enter or revise RT bronchodilator order(s) to  equivalent RT Bronchodilator order with Frequency of every 4 hours PRN for wheezing or increased work of breathing using Per Protocol order mode. 4-6 - enter or revise RT Bronchodilator order(s) to two equivalent RT bronchodilator orders with one order with BID Frequency and one order with Frequency of every 4 hours PRN wheezing or increased work of breathing using Per Protocol order mode. 7-10 - enter or revise RT Bronchodilator order(s) to two equivalent RT bronchodilator orders with one order with TID Frequency and one order with Frequency of every 4 hours PRN wheezing or increased work of breathing using Per Protocol order mode. 11-13 - enter or revise RT Bronchodilator order(s) to one equivalent RT bronchodilator order with QID Frequency and an Albuterol order with Frequency of every 4 hours PRN wheezing or increased work of breathing using Per Protocol order mode. Greater than 13 - enter or revise RT Bronchodilator order(s) to one equivalent RT bronchodilator order with every 4 hours Frequency and an Albuterol order with Frequency of every 2 hours PRN wheezing or increased work of breathing using Per Protocol order mode. RT to enter RT Home Evaluation for COPD & MDI Assessment order using Per Protocol order mode.     Electronically signed by LEIA JENNINGS RCP on 2/28/2023 at 7:03 AM

## 2023-02-28 NOTE — ED PROVIDER NOTES
In addition to the advanced practice provider, I personally saw Jesus Hatch and performed a substantive portion of the visit including all aspects of the medical decision making.    Briefly, this is a 77 y.o. male here for altered mental status.  Patient with history of dementia, currently in [facility].  Per EMS, patient was found to be more confused and lethargic than normal by nursing home staff.  Unclear when he was last seen well.  On arrival to the emergency department, patient is ill-appearing, hypoxic and in respiratory distress.  He reports feeling weak, is unable to contribute much more meaningfully to history.    On exam, patient afebrile and ill appearing.  Drowsy, easily arousable to voice and light touch, protecting his airway.  He is tachypneic and in respiratory distress.  No retractions. Heart RRR. Lungs diminished at bilateral bases, left more so than right.  Scattered rhonchi. Abdomen soft, nondistended, nontender to palpation in all quadrants.  2+ pitting edema symmetric lower extremities.      EKG  EKG was reviewed by emergency department physician in the absence of a cardiologist    Narrow complex sinus rhythm, rate 78, left axis deviation, normal NV and QRS intervals, normal Qtc, no ST elevations or depressions, normal t-wave morphology, impression NSR with solitary PVC, no STEMI            MDM    Patient afebrile however ill-appearing and in respiratory distress on arrival to the emergency department.  He is drowsy, however easily arousable and protecting his airway.  Initial hypoxia corrected with supplemental oxygen by nasal cannula.  EKG without evidence of acute ischemia, troponin minimally elevated at 0.03, patient denies chest pain, lower suspicion for ACS.  Suspect troponin elevation secondary to acute congestive heart failure.  No focal neurologic deficits, low suspicion for CVA/TIA.  CT head without evidence of hemorrhage or mass effect.  CXR with pulmonary vascular  congestion and pleural effusions. No pneumothorax or mediastinal abnormality. BNP markedly elevated consistent with CHF. Pulmonary embolism is considered, CTA chest was pursued, limited by motion artifact however no obvious pulmonary embolism. .  Patient received intravenous furosemide for diuresis with good urine output. On my reevaluation he was much more alert and respiratory distress was resolved. Case discussed with internal medicine team and will plan for admission for hypoxic respiratory failure and acute CHF. I Dr. Liban Sosa am the primary clinician of record. Critical Care:    I have discussed the case with the advanced practice provider. I have personally performed a history, physical exam, and my own medical decision making. I have reviewed the note and agree with the findings and plan. Upon my evaluation, this patient had a high probability of imminent or life-threatening deterioration due to acute hypoxic respiratory failure, acute congestive heart failure which required my direct attention, intervention, and personal management. I personally saw the patient and independently provided 36 minutes of non-concurrent critical care out of the total shared critical care time provided. The critical care time spent while evaluating and treating this patient was exclusive of any time spent doing separately billable procedures. This critical care time includes time at the bedside, data interpretation, medication management, monitoring for potential decompensation and physician consultation. Specifics of interventions taken and potentially life-threatening diagnostic considerations are listed above in the medical decision making. Patient Referrals:  No follow-up provider specified. Discharge Medications:  New Prescriptions    No medications on file       FINAL IMPRESSION  1. Acute respiratory failure with hypoxia ([address]    2.  Acute congestive heart failure, unspecified heart failure type ([address] 3. Acute encephalopathy    4. History of dementia        Blood pressure (!) 150/85, pulse 69, temperature 97.8 °F (36.6 °C), temperature source Oral, resp. rate 18, SpO2 98 %. For further details of [facility] emergency department encounter, please see documentation by advanced practice provider, Janay Cook NP.     Malia Grant DO (electronically signed)  Attending Emergency Physician       Malia Grant DO  02/28/23 8000

## 2023-03-01 NOTE — PROGRESS NOTES
Patient discharged from hospital at 2015. Patient left by transport with charles and IV still in place per facility request. Patient left on 4L NC for comfort. Belongings bagged up and taken as well.

## 2023-03-02 NOTE — DISCHARGE SUMMARY
Hospital Medicine Discharge Summary    Patient ID: Sonido Vidales      Patient's PCP: No primary care provider on file. Admit Date: 2/27/2023     Discharge Date: 2/28/2023     Admitting Physician: Venus Rain MD     Discharge Physician: Sylvia Calloway MD        Active Hospital Problems    Diagnosis     Coronary artery disease [I25.10]      Priority: High    Acute on chronic diastolic (congestive) heart failure (HCC) [I50.33]      Priority: Medium    Advanced dementia [F03. C0]      Priority: Medium    CAD (coronary artery disease) [I25.10]     Essential hypertension [I10]     Hyperlipidemia [E78.5]          Hospital Course: This 29-year-old male with PMHx of advanced dementia, CAD, hypertension, hyperlipidemia presented from [address] [place] [address] with generalized weakness, malaise, worsening confusion, BLE pitting edema. On admission, patient was confused and unable to provide any history. BNP elevated 38,439. CXR showed pleural effusion. Echo obtained showed EF of 20 to 25%, severe diffuse hypokinesis, grade 3 DD with elevated LV filling pressures. Cardiology was consulted and patient was started on IV Lasix. Due to patient's poor prognosis and worsening respiratory status, palliative care consulted. After talking with the family, CODE STATUS was changed to DNR CC. Hospice was consulted and patient was discharged to hospice. Physical Exam Performed:     BP (!) 157/96   Pulse 77   Temp 96.9 °F (36.1 °C)   Resp 14   Wt 185 lb 9.6 oz (84.2 kg)   SpO2 96%   BMI 25.17 kg/m²     General appearance: Lethargic, ill looking and somnolent  ENT: Moist oral mucosa. Trachea midline, no adenopathy. Cardiovascular: Regular rhythm, normal S1, S2. No murmur. Respiratory: Diminished breath sounds bilaterally  GI: Abdomen soft, no tenderness, not distended, normal bowel sounds  Musculoskeletal:  No cyanosis in digits.   Pitting edema present  Neurology: Unable to obtain as patient lethargic  Psych: Unable to obtain due to patient factors  Skin: Warm, dry, normal turgor    Consults:     IP CONSULT TO HEART FAILURE NURSE/COORDINATOR  IP CONSULT TO DIETITIAN  IP CONSULT TO CARDIOLOGY  IP CONSULT TO PALLIATIVE CARE  IP CONSULT TO HOSPICE    Disposition: Hospice medical facility    Condition at Discharge: Guarded    Discharge Instructions/Follow-up:     Discharge Medications:     Discharge Medication List as of 2/28/2023  6:56 PM             Details   albuterol (PROVENTIL) (2.5 MG/3ML) 0.083% nebulizer solution Take 2.5 mg by nebulization 3 times daily as needed for WheezingHistorical Med             The patient was seen and examined on day of discharge and this discharge summary is in conjunction with any daily progress note from day of discharge. Time Spent on discharge is 45 minutes  in the examination, evaluation, counseling and review of medications and discharge plan. Note that greater  than 30 minutes was spent in preparing discharge papers, discussing discharge with patient, medication review, etc.     Signed:    Stephanie Osorio MD   3/2/2023      Thank you No primary care provider on file. for the opportunity to be involved in this patient's care. If you have any questions or concerns please feel free to contact me at [phone].

## 2023-03-03 LAB
BLOOD CULTURE, ROUTINE: NORMAL
CULTURE, BLOOD 2: NORMAL

## 2024-01-13 NOTE — CONSULTS
[facility]  Cardiac Consult     Referring Provider:  No primary care provider on file. History of Present Illness:  68 y/o male with severe dementia as well as CAD admitted with progressive mental status issues found to have respiratory failure. BNP high and pro calcitonin low suggestive CHF rather than pneumonia. LV function unclear. He is unresponsive and cannot give any history. Past Medical History:   has a past medical history of CAD (coronary artery disease), Cardiac abnormality, Chronic kidney disease, Dementia ([facility] [address] Hyperlipidemia, Hypertension, Liver failure ([facility] [address] and MI (myocardial infarction) ([facility] [address] Surgical History:   has a past surgical history that includes Coronary angioplasty with stent; Insertable Cardiac Monitor (07/2017); Appendectomy; and Cardiac surgery. Social History:   reports that he quit smoking about 53 years ago. His smoking use included cigarettes. He has never used smokeless tobacco. He reports that he does not drink alcohol and does not use drugs. Family History:  family history includes Alcohol Abuse in his father; Other in his father. Medications:   aspirin  81 mg Oral Daily    atorvastatin  80 mg Oral Daily    divalproex  125 mg Oral TID    FLUoxetine  20 mg Oral Daily    metoprolol tartrate  12.5 mg Oral BID    mirtazapine  7.5 mg Oral Nightly    pantoprazole  40 mg Oral BID    QUEtiapine  25 mg Oral Nightly    vitamin B-1  100 mg Oral Daily    sodium chloride flush  5-40 mL IntraVENous 2 times per day    enoxaparin  40 mg SubCUTAneous Daily    furosemide  40 mg IntraVENous BID         Allergies:  Morphine and related     [x] Medications and dosages reviewed.     ROS:  [x]Full ROS obtained and negative except as mentioned in HPI      Physical Examination:    Vitals:    02/28/23 0700   BP:    Pulse: 70   Resp: 17   Temp:    SpO2: 94%        GENERAL: chronically ill elderly male who does not respond  NEUROLOGICAL: does not respond  PSYCH: Calm “Patient's name, , procedure and correct site were confirmed during the [place] Timeout.” affect  SKIN: Warm and dry, No visible rash,   EYES: Pupils equal and round, Sclera non-icteric,   HENT:  External ears and nose unremarkable, mucus membranes moist  MUSCULOSKELETAL: Normal cephalic, neck supple  CAROTID: Normal upstroke, no bruits  CARDIAC: JVP normal, Normal PMI, regular rate and rhythm, normal S1S2, no murmur, rub, or gallop  RESPIRATORY: Normal respiratory effort, basilar crackles and mild wheezing  bilaterally  EXTREMITIES: No LE edema  GASTROINTESTINAL: normal bowel sounds, soft, non-tender, No hepatomegaly     All testing and labs listed below were personally reviewed. EKG -Personal interpretation  NSR, LAD      CXR-Bilateral lower lobe infiltrates L>R, left effusion  Heart size is globular and enlarged without change. Aorta is tortuous. Mediastiu=num is not widened. .  Lungs are normally expanded. Consolidation   throughout the left lung. .  There is also fullness of the right hilum. Left   pleural effusions. Mild spondylosis   Impression:    Consolidation in the left lung and a left pleural effusion     CT HEAD  Impression:  Motion artifact limiting the exam.  Within the limitations of the exam, no   obvious acute intracranial abnormality is seen. Mild atrophy and mild chronic microischemic changes scattered in the deep   white matter which is unchanged. CTPA  Impression:  Motion artifact limiting the exam.  Within the limitations of the exam, no   obvious acute pulmonary embolus is seen. Mildly dilated thoracic aorta with no aneurysm     Moderate bibasilar pleural effusions with adjacent moderate atelectasis and   consolidation along the lung bases extending into the upper chest.     LABS  WBC7.3K/uL RBC4.27M/uL Tqpdlisrce67.4 Low g/dL Tayylbsyvn88.3 Low % MCV92. 1fL MCH29.1pg MCHC31.6g/dL RDW22.2 High % Gssluolto283H/uL     Calcium9.6mg/dL Total Protein7.5g/dL Albumin3.9g/dL Albumin/Globulin Ratio1. 1 Total Bilirubin0.8mg/dL Alkaline Dxnhyrqgzbm35Y/L ALT20U/L AST31U/L Procalcitonin0.04ng/mL     [facility]-JZF03,580 High pg/mL     SARS-CoV-2, NAATNot Detected     Troponin0.04 High ng/mL     Yqtzmm674yrda/L Potassium4.5mmol/L Tztohgml727tzua/L FW437rxou/L Anion Gap14 Tfvgigo68yy/dL BUN26 High mg/dL Creatinine1.2mg/dL     ECHO 2020-[facility]  Summary:   Overall left ventricular ejection fraction is estimated to be 55-60%. There is mild concentric left ventricular hypertrophy. The left ventricular wall motion is normal.   The left atrium is mildly dilated. [employer] 2020 [facility]  IMAGING FINDINGS:   LVEDV:   155ml     (Normal is up to 100ml for women and 150ml for men)   LVEF:   41 %      (Normal is at least 62% for women and 53% for men)   TRANSIENT DILATATION RATIO:    1.19      (Normal is up to 1.3 for women and 1.2 for men)   LV WALL MOTION:   Mildly impaired left ventricular wall thickening with hypokinesis of the septum. SPECT PERFUSION IMAGES:   There are no convincing fixed or reversible perfusion defects to indicate myocardial infarct or ischemia. CARDIAC CATH 2018  1. Normal left ventricular function. Estimated EF is 55-60%. No mitral valve regurgitation. Calcification noted in the walls of the thoracic aorta, mainly in the arch region. Loop monitor noted. LV pressure: 126/9; aortic: 121/61. 2.  No mitral valve regurgitation. 3.  Patent stents at the bifurcation of the LAD and the major diagonal branch. No restenosis. 4.  Mild nonobstructive coronary disease, especially in the RCA. Recommendations: Medical management for CAD. Evaluate for noncardiac chest pain. Continue with intense risk factor treatment. ASSESSMENT:    Respiratory Failure:  Severe requiring admission, iv diuretics and O2. Likely CHF. Agree with lasix. Awaiting ECHO  Chronic aspiration is also possible with mental status issues. Repeat CXR in am after some diuresis.     CHF:  Apparent acute on chronic CHF  EF unclear  Await ECHO  Continue diuresis  Follow renal function “Patient's name, , procedure and correct site were confirmed during the [place] Timeout.” daily with iv diuresis  Add ACE     HTN:  Uncontrolled  Start lisinopril 2.5  Watch renal function with diuresis  Lopressor 25 BID    Dementia:  Severe progression.   Currently does no respond to questions    CAD:  Clinically stable  Medical thearpy  Continue ASA and lipitor    Plan:  Diuresis  Treat HTN  Follow renal function  ECHO    Thank you for allowing me to participate in the care of this individual.      Charlotte Gill M.D., [facility]